# Patient Record
Sex: MALE | Race: WHITE
[De-identification: names, ages, dates, MRNs, and addresses within clinical notes are randomized per-mention and may not be internally consistent; named-entity substitution may affect disease eponyms.]

---

## 2017-04-23 ENCOUNTER — HOSPITAL ENCOUNTER (INPATIENT)
Dept: HOSPITAL 83 - ED | Age: 52
LOS: 3 days | Discharge: HOME | DRG: 371 | End: 2017-04-26
Attending: INTERNAL MEDICINE | Admitting: INTERNAL MEDICINE
Payer: COMMERCIAL

## 2017-04-23 VITALS — HEIGHT: 70 IN | WEIGHT: 171.56 LBS | BODY MASS INDEX: 24.56 KG/M2

## 2017-04-23 VITALS — DIASTOLIC BLOOD PRESSURE: 70 MMHG

## 2017-04-23 VITALS — DIASTOLIC BLOOD PRESSURE: 89 MMHG

## 2017-04-23 DIAGNOSIS — E43: ICD-10-CM

## 2017-04-23 DIAGNOSIS — A04.9: Primary | ICD-10-CM

## 2017-04-23 DIAGNOSIS — K52.9: ICD-10-CM

## 2017-04-23 DIAGNOSIS — Z79.899: ICD-10-CM

## 2017-04-23 DIAGNOSIS — D53.9: ICD-10-CM

## 2017-04-23 DIAGNOSIS — Z84.89: ICD-10-CM

## 2017-04-23 DIAGNOSIS — D72.810: ICD-10-CM

## 2017-04-23 DIAGNOSIS — E83.39: ICD-10-CM

## 2017-04-23 DIAGNOSIS — F17.200: ICD-10-CM

## 2017-04-23 DIAGNOSIS — E03.9: ICD-10-CM

## 2017-04-23 DIAGNOSIS — F10.129: ICD-10-CM

## 2017-04-23 DIAGNOSIS — I48.91: ICD-10-CM

## 2017-04-23 DIAGNOSIS — E86.0: ICD-10-CM

## 2017-04-23 DIAGNOSIS — K76.0: ICD-10-CM

## 2017-04-23 DIAGNOSIS — R55: ICD-10-CM

## 2017-04-23 DIAGNOSIS — E87.6: ICD-10-CM

## 2017-04-23 DIAGNOSIS — I10: ICD-10-CM

## 2017-04-23 LAB
ALBUMIN SERPL-MCNC: 3.6 GM/DL (ref 3.1–4.5)
ALBUMIN SERPL-MCNC: 3.7 GM/DL (ref 3.1–4.5)
ALP SERPL-CCNC: 100 U/L (ref 45–117)
ALP SERPL-CCNC: 94 U/L (ref 45–117)
ALT SERPL W P-5'-P-CCNC: 29 U/L (ref 12–78)
ALT SERPL W P-5'-P-CCNC: 29 U/L (ref 12–78)
AST SERPL-CCNC: 33 IU/L (ref 3–35)
AST SERPL-CCNC: 35 IU/L (ref 3–35)
BASOPHILS # BLD AUTO: 0 10*3/UL (ref 0–0.1)
BASOPHILS # BLD AUTO: 0.1 10*3/UL (ref 0–0.1)
BASOPHILS NFR BLD AUTO: 0.7 % (ref 0–1)
BASOPHILS NFR BLD AUTO: 0.8 % (ref 0–1)
BUN SERPL-MCNC: 6 MG/DL (ref 7–24)
BUN SERPL-MCNC: 6 MG/DL (ref 7–24)
CHLORIDE SERPL-SCNC: 107 MMOL/L (ref 98–107)
CHLORIDE SERPL-SCNC: 108 MMOL/L (ref 98–107)
CO2 SERPL-SCNC: 24 MMOL/L (ref 21–32)
CO2 SERPL-SCNC: 26 MMOL/L (ref 21–32)
CRP SERPL-MCNC: 0.45 MG/DL (ref 0–0.3)
DIGOXIN SERPL-MCNC: 0.3 NG/ML (ref 0.8–2)
EOSINOPHIL # BLD AUTO: 0.3 10*3/UL (ref 0–0.4)
EOSINOPHIL # BLD AUTO: 0.4 10*3/UL (ref 0–0.4)
EOSINOPHIL # BLD AUTO: 5.3 % (ref 1–4)
EOSINOPHIL # BLD AUTO: 5.4 % (ref 1–4)
ERYTHROCYTE [DISTWIDTH] IN BLOOD BY AUTOMATED COUNT: 12.9 % (ref 0–14.5)
ERYTHROCYTE [DISTWIDTH] IN BLOOD BY AUTOMATED COUNT: 13 % (ref 0–14.5)
GLUCOSE SERPL-MCNC: 116 MG/DL (ref 65–99)
GLUCOSE SERPL-MCNC: 89 MG/DL (ref 65–99)
HCT VFR BLD AUTO: 42.7 % (ref 42–52)
HCT VFR BLD AUTO: 42.7 % (ref 42–52)
HGB BLD-MCNC: 15.3 G/DL (ref 14–18)
HGB BLD-MCNC: 15.4 G/DL (ref 14–18)
IG #: 0.1 10*3/UL (ref 0–0.1)
IG #: 0.1 10*3/UL (ref 0–0.1)
INR BLD: 1 (ref 2–3.5)
LYMPHOCYTES # BLD AUTO: 0.8 10*3/UL (ref 1.3–4.4)
LYMPHOCYTES # BLD AUTO: 0.9 10*3/UL (ref 1.3–4.4)
LYMPHOCYTES NFR BLD AUTO: 12.2 % (ref 27–41)
LYMPHOCYTES NFR BLD AUTO: 15.4 % (ref 27–41)
MAGNESIUM SERPL-MCNC: 1.5 MG/DL (ref 1.5–2.1)
MCH RBC QN AUTO: 36.6 PG (ref 27–31)
MCH RBC QN AUTO: 36.7 PG (ref 27–31)
MCHC RBC AUTO-ENTMCNC: 35.8 G/DL (ref 33–37)
MCHC RBC AUTO-ENTMCNC: 36.1 G/DL (ref 33–37)
MCV RBC AUTO: 101.7 FL (ref 80–94)
MCV RBC AUTO: 102.2 FL (ref 80–94)
MONOCYTES # BLD AUTO: 0.8 10*3/UL (ref 0.1–1)
MONOCYTES # BLD AUTO: 0.9 10*3/UL (ref 0.1–1)
MONOCYTES NFR BLD MANUAL: 12.5 % (ref 3–9)
MONOCYTES NFR BLD MANUAL: 14.5 % (ref 3–9)
NEUT #: 4 10*3/UL (ref 2.3–7.9)
NEUT #: 4.3 10*3/UL (ref 2.3–7.9)
NEUT %: 65.1 % (ref 47–73)
NEUT %: 66 % (ref 47–73)
NRBC BLD QL AUTO: 0 10*3/UL (ref 0–0)
NRBC BLD QL AUTO: 0 10*3/UL (ref 0–0)
PLATELET # BLD AUTO: 197 10*3/UL (ref 130–400)
PLATELET # BLD AUTO: 202 10*3/UL (ref 130–400)
PMV BLD AUTO: 10.4 FL (ref 9.6–12.3)
PMV BLD AUTO: 10.5 FL (ref 9.6–12.3)
POTASSIUM SERPL-SCNC: 3 MMOL/L (ref 3.5–5.1)
POTASSIUM SERPL-SCNC: 3.1 MMOL/L (ref 3.5–5.1)
PROT SERPL-MCNC: 7.9 GM/DL (ref 6.4–8.2)
PROT SERPL-MCNC: 8 GM/DL (ref 6.4–8.2)
PROTHROMBIN TIME: 10.9 SECONDS (ref 9–12.4)
RBC # BLD AUTO: 4.18 10*6/UL (ref 4.5–5.9)
RBC # BLD AUTO: 4.2 10*6/UL (ref 4.5–5.9)
SODIUM SERPL-SCNC: 141 MMOL/L (ref 136–145)
SODIUM SERPL-SCNC: 141 MMOL/L (ref 136–145)
TROPONIN I SERPL-MCNC: < 0.015 NG/ML (ref ?–0.04)
TSH SERPL DL<=0.005 MIU/L-ACNC: 45.6 UIU/ML (ref 0.36–4.75)
WBC NRBC COR # BLD AUTO: 6.1 10*3/UL (ref 4.8–10.8)
WBC NRBC COR # BLD AUTO: 6.5 10*3/UL (ref 4.8–10.8)

## 2017-04-24 VITALS — DIASTOLIC BLOOD PRESSURE: 63 MMHG | SYSTOLIC BLOOD PRESSURE: 96 MMHG

## 2017-04-24 VITALS — DIASTOLIC BLOOD PRESSURE: 80 MMHG | SYSTOLIC BLOOD PRESSURE: 135 MMHG

## 2017-04-24 VITALS — DIASTOLIC BLOOD PRESSURE: 72 MMHG

## 2017-04-24 VITALS — DIASTOLIC BLOOD PRESSURE: 79 MMHG

## 2017-04-24 VITALS — DIASTOLIC BLOOD PRESSURE: 86 MMHG | SYSTOLIC BLOOD PRESSURE: 128 MMHG

## 2017-04-24 VITALS — SYSTOLIC BLOOD PRESSURE: 134 MMHG | DIASTOLIC BLOOD PRESSURE: 79 MMHG

## 2017-04-24 LAB
ALBUMIN SERPL-MCNC: NEGATIVE G/DL
APPEARANCE UR: CLEAR
BILIRUB UR QL STRIP: NEGATIVE
CHOLEST SERPL-MCNC: 126 MG/DL (ref ?–200)
CK MB SERPL-MCNC: 1.5 NG/ML (ref 0.5–3.6)
CK MB SERPL-MCNC: 1.7 NG/ML (ref 0.5–3.6)
CK MB SERPL-MCNC: 1.7 NG/ML (ref 0.5–3.6)
CK SERPL-CCNC: 110 U/L (ref 39–308)
CK SERPL-CCNC: 123 U/L (ref 39–308)
CK SERPL-CCNC: 99 U/L (ref 39–308)
COLOR UR: YELLOW
EPI CELLS #/AREA URNS HPF: (no result) /[HPF]
EST. AVERAGE GLUCOSE BLD GHB EST-MCNC: 94 MG/DL
GLUCOSE UR QL: NEGATIVE
HDLC SERPL-MCNC: 72 MG/DL (ref 40–60)
HGB UR QL STRIP: NEGATIVE
INR BLD: 1 (ref 2–3.5)
KETONES UR QL STRIP: NEGATIVE
LDLC SERPL DIRECT ASSAY-MCNC: 48 MG/DL (ref 9–159)
LEUKOCYTE ESTERASE UR QL STRIP: NEGATIVE
NITRITE UR QL STRIP: NEGATIVE
PH UR STRIP: 5 [PH] (ref 5–9)
PROTHROMBIN TIME: 10.9 SECONDS (ref 9–12.4)
SP GR UR: 1.01 (ref 1–1.03)
T4 FREE SERPL-MCNC: 0.48 NG/DL (ref 0.76–1.46)
TRIGL SERPL-MCNC: 29 MG/DL (ref ?–150)
TROPONIN I SERPL-MCNC: < 0.015 NG/ML (ref ?–0.04)
TSH SERPL DL<=0.005 MIU/L-ACNC: 32.1 UIU/ML (ref 0.36–4.75)
URINE REFLEX COMMENT: NO
UROBILINOGEN UR STRIP-MCNC: 0.2 E.U./DL (ref 0.2–1)
VLDLC SERPL CALC-MCNC: 6 MG/DL (ref 6–40)
WBC #/AREA URNS HPF: (no result) WBC/HPF (ref 0–5)

## 2017-04-25 VITALS — DIASTOLIC BLOOD PRESSURE: 86 MMHG

## 2017-04-25 VITALS — SYSTOLIC BLOOD PRESSURE: 150 MMHG | DIASTOLIC BLOOD PRESSURE: 89 MMHG

## 2017-04-25 VITALS — SYSTOLIC BLOOD PRESSURE: 121 MMHG | DIASTOLIC BLOOD PRESSURE: 77 MMHG

## 2017-04-25 VITALS — DIASTOLIC BLOOD PRESSURE: 78 MMHG

## 2017-04-25 VITALS — DIASTOLIC BLOOD PRESSURE: 87 MMHG

## 2017-04-25 LAB
25(OH)D3 SERPL-MCNC: 53.3 NG/ML (ref 30–100)
ALBUMIN SERPL-MCNC: 2.6 GM/DL (ref 3.1–4.5)
ALP SERPL-CCNC: 68 U/L (ref 45–117)
ALT SERPL W P-5'-P-CCNC: 16 U/L (ref 12–78)
AST SERPL-CCNC: 20 IU/L (ref 3–35)
BASOPHILS # BLD AUTO: 0.1 10*3/UL (ref 0–0.1)
BASOPHILS NFR BLD AUTO: 1.3 % (ref 0–1)
BUN SERPL-MCNC: 10 MG/DL (ref 7–24)
CHLORIDE SERPL-SCNC: 111 MMOL/L (ref 98–107)
CHOLEST SERPL-MCNC: 119 MG/DL (ref ?–200)
CO2 SERPL-SCNC: 23 MMOL/L (ref 21–32)
EOSINOPHIL # BLD AUTO: 0.2 10*3/UL (ref 0–0.4)
EOSINOPHIL # BLD AUTO: 5.8 % (ref 1–4)
ERYTHROCYTE [DISTWIDTH] IN BLOOD BY AUTOMATED COUNT: 13.2 % (ref 0–14.5)
EST. AVERAGE GLUCOSE BLD GHB EST-MCNC: 97 MG/DL
FOLATE SERPL-MCNC: 2.52 NG/ML (ref 5.38–?)
GLUCOSE SERPL-MCNC: 87 MG/DL (ref 65–99)
HCT VFR BLD AUTO: 35.3 % (ref 42–52)
HDLC SERPL-MCNC: 80 MG/DL (ref 40–60)
HGB BLD-MCNC: 12.5 G/DL (ref 14–18)
IG #: 0 10*3/UL (ref 0–0.1)
LDLC SERPL DIRECT ASSAY-MCNC: 34 MG/DL (ref 9–159)
LYMPHOCYTES # BLD AUTO: 0.5 10*3/UL (ref 1.3–4.4)
LYMPHOCYTES NFR BLD AUTO: 12.3 % (ref 27–41)
MAGNESIUM SERPL-MCNC: 1.1 MG/DL (ref 1.5–2.1)
MCH RBC QN AUTO: 36.8 PG (ref 27–31)
MCHC RBC AUTO-ENTMCNC: 35.4 G/DL (ref 33–37)
MCV RBC AUTO: 103.8 FL (ref 80–94)
MONOCYTES # BLD AUTO: 0.7 10*3/UL (ref 0.1–1)
MONOCYTES NFR BLD MANUAL: 16.3 % (ref 3–9)
NEUT #: 2.5 10*3/UL (ref 2.3–7.9)
NEUT %: 63.5 % (ref 47–73)
NRBC BLD QL AUTO: 0 10*3/UL (ref 0–0)
PHOSPHATE SERPL-MCNC: 2.4 MG/DL (ref 2.5–4.9)
PLATELET # BLD AUTO: 149 10*3/UL (ref 130–400)
PMV BLD AUTO: 10.7 FL (ref 9.6–12.3)
POTASSIUM SERPL-SCNC: 3.1 MMOL/L (ref 3.5–5.1)
PROT SERPL-MCNC: 6 GM/DL (ref 6.4–8.2)
RBC # BLD AUTO: 3.4 10*6/UL (ref 4.5–5.9)
SODIUM SERPL-SCNC: 144 MMOL/L (ref 136–145)
T4 FREE SERPL-MCNC: 0.58 NG/DL (ref 0.76–1.46)
TRIGL SERPL-MCNC: 27 MG/DL (ref ?–150)
VITAMIN B12: 759 PG/ML (ref 247–911)
VLDLC SERPL CALC-MCNC: 5 MG/DL (ref 6–40)
WBC NRBC COR # BLD AUTO: 4 10*3/UL (ref 4.8–10.8)

## 2017-04-26 VITALS — SYSTOLIC BLOOD PRESSURE: 167 MMHG | DIASTOLIC BLOOD PRESSURE: 96 MMHG

## 2017-04-26 LAB
BASOPHILS # BLD AUTO: 0 10*3/UL (ref 0–0.1)
BASOPHILS NFR BLD AUTO: 0.7 % (ref 0–1)
BUN SERPL-MCNC: 10 MG/DL (ref 7–24)
CHLORIDE SERPL-SCNC: 110 MMOL/L (ref 98–107)
CO2 SERPL-SCNC: 24 MMOL/L (ref 21–32)
EOSINOPHIL # BLD AUTO: 0.2 10*3/UL (ref 0–0.4)
EOSINOPHIL # BLD AUTO: 5.3 % (ref 1–4)
ERYTHROCYTE [DISTWIDTH] IN BLOOD BY AUTOMATED COUNT: 13.3 % (ref 0–14.5)
GLUCOSE SERPL-MCNC: 113 MG/DL (ref 65–99)
HCT VFR BLD AUTO: 35 % (ref 42–52)
HGB BLD-MCNC: 12.4 G/DL (ref 14–18)
IG #: 0 10*3/UL (ref 0–0.1)
LYMPHOCYTES # BLD AUTO: 0.6 10*3/UL (ref 1.3–4.4)
LYMPHOCYTES NFR BLD AUTO: 13.5 % (ref 27–41)
MAGNESIUM SERPL-MCNC: 1.1 MG/DL (ref 1.5–2.1)
MCH RBC QN AUTO: 36.7 PG (ref 27–31)
MCHC RBC AUTO-ENTMCNC: 35.4 G/DL (ref 33–37)
MCV RBC AUTO: 103.6 FL (ref 80–94)
MONOCYTES # BLD AUTO: 0.6 10*3/UL (ref 0.1–1)
MONOCYTES NFR BLD MANUAL: 14.2 % (ref 3–9)
NEUT #: 2.9 10*3/UL (ref 2.3–7.9)
NEUT %: 65.6 % (ref 47–73)
NRBC BLD QL AUTO: 0 10*3/UL (ref 0–0)
PHOSPHATE SERPL-MCNC: 2.9 MG/DL (ref 2.5–4.9)
PLATELET # BLD AUTO: 147 10*3/UL (ref 130–400)
PMV BLD AUTO: 11.2 FL (ref 9.6–12.3)
POTASSIUM SERPL-SCNC: 3.5 MMOL/L (ref 3.5–5.1)
RBC # BLD AUTO: 3.38 10*6/UL (ref 4.5–5.9)
SODIUM SERPL-SCNC: 141 MMOL/L (ref 136–145)
WBC NRBC COR # BLD AUTO: 4.4 10*3/UL (ref 4.8–10.8)

## 2017-08-30 ENCOUNTER — HOSPITAL ENCOUNTER (OUTPATIENT)
Dept: HOSPITAL 83 - LAB | Age: 52
Discharge: HOME | End: 2017-08-30
Attending: FAMILY MEDICINE
Payer: COMMERCIAL

## 2017-08-30 DIAGNOSIS — F10.99: ICD-10-CM

## 2017-08-30 DIAGNOSIS — Z72.0: ICD-10-CM

## 2017-08-30 DIAGNOSIS — Z12.5: Primary | ICD-10-CM

## 2017-08-30 DIAGNOSIS — Z85.49: ICD-10-CM

## 2017-08-30 DIAGNOSIS — E03.9: ICD-10-CM

## 2017-08-30 LAB
ALBUMIN SERPL-MCNC: 3.8 GM/DL (ref 3.1–4.5)
ALP SERPL-CCNC: 81 U/L (ref 45–117)
ALT SERPL W P-5'-P-CCNC: 20 U/L (ref 12–78)
AST SERPL-CCNC: 19 IU/L (ref 3–35)
BUN SERPL-MCNC: 10 MG/DL (ref 7–24)
CHLORIDE SERPL-SCNC: 103 MMOL/L (ref 98–107)
CHOLEST SERPL-MCNC: 188 MG/DL (ref ?–200)
CREAT SERPL-MCNC: 1 MG/DL (ref 0.7–1.3)
ERYTHROCYTE [DISTWIDTH] IN BLOOD BY AUTOMATED COUNT: 14.4 % (ref 0–14.5)
GGT SERPL-CCNC: 33 U/L (ref 15–85)
HCT VFR BLD AUTO: 44.5 % (ref 42–52)
HDLC SERPL-MCNC: 88 MG/DL (ref 40–60)
HGB BLD-MCNC: 15.6 G/DL (ref 14–18)
LDLC SERPL DIRECT ASSAY-MCNC: 85 MG/DL (ref 9–159)
MACROCYTES BLD QL SMEAR: (no result)
MAGNESIUM SERPL-MCNC: 1.4 MG/DL (ref 1.5–2.1)
MCH RBC QN AUTO: 38.4 PG (ref 27–31)
MCHC RBC AUTO-ENTMCNC: 35.1 G/DL (ref 33–37)
MCV RBC AUTO: 109.6 FL (ref 80–94)
NRBC BLD QL AUTO: 0 % (ref 0–0)
PLATELET # BLD AUTO: 197 10*3/UL (ref 130–400)
PLATELET SUFFICIENCY: NORMAL
PMV BLD AUTO: 10.7 FL (ref 9.6–12.3)
POTASSIUM SERPL-SCNC: 3.8 MMOL/L (ref 3.5–5.1)
PROT SERPL-MCNC: 8.4 GM/DL (ref 6.4–8.2)
RBC # BLD AUTO: 4.06 10*6/UL (ref 4.5–5.9)
SODIUM SERPL-SCNC: 139 MMOL/L (ref 136–145)
TOTAL CELLS COUNTED: 100 #CELLS
TRIGL SERPL-MCNC: 74 MG/DL (ref ?–150)
TSH SERPL DL<=0.005 MIU/L-ACNC: 69.6 UIU/ML (ref 0.36–4.75)
VLDLC SERPL CALC-MCNC: 15 MG/DL (ref 6–40)
WBC NRBC COR # BLD AUTO: 4.7 10*3/UL (ref 4.8–10.8)

## 2017-12-01 ENCOUNTER — HOSPITAL ENCOUNTER (OUTPATIENT)
Dept: HOSPITAL 83 - LAB | Age: 52
Discharge: HOME | End: 2017-12-01
Attending: NURSE PRACTITIONER
Payer: COMMERCIAL

## 2017-12-01 DIAGNOSIS — E03.9: Primary | ICD-10-CM

## 2018-01-31 ENCOUNTER — HOSPITAL ENCOUNTER (OUTPATIENT)
Dept: HOSPITAL 83 - LAB | Age: 53
Discharge: HOME | End: 2018-01-31
Attending: NURSE PRACTITIONER
Payer: COMMERCIAL

## 2018-01-31 DIAGNOSIS — E03.9: Primary | ICD-10-CM

## 2018-03-11 ENCOUNTER — HOSPITAL ENCOUNTER (INPATIENT)
Dept: HOSPITAL 83 - ED | Age: 53
LOS: 1 days | Discharge: HOME | DRG: 190 | End: 2018-03-12
Attending: INTERNAL MEDICINE | Admitting: INTERNAL MEDICINE
Payer: COMMERCIAL

## 2018-03-11 VITALS — DIASTOLIC BLOOD PRESSURE: 70 MMHG | SYSTOLIC BLOOD PRESSURE: 110 MMHG

## 2018-03-11 VITALS — HEIGHT: 69.5 IN | BODY MASS INDEX: 24.79 KG/M2 | WEIGHT: 171.19 LBS

## 2018-03-11 VITALS — SYSTOLIC BLOOD PRESSURE: 148 MMHG | DIASTOLIC BLOOD PRESSURE: 72 MMHG

## 2018-03-11 VITALS — DIASTOLIC BLOOD PRESSURE: 51 MMHG

## 2018-03-11 VITALS — DIASTOLIC BLOOD PRESSURE: 89 MMHG

## 2018-03-11 VITALS — DIASTOLIC BLOOD PRESSURE: 71 MMHG

## 2018-03-11 VITALS — DIASTOLIC BLOOD PRESSURE: 70 MMHG | SYSTOLIC BLOOD PRESSURE: 156 MMHG

## 2018-03-11 VITALS — DIASTOLIC BLOOD PRESSURE: 78 MMHG

## 2018-03-11 DIAGNOSIS — E44.1: ICD-10-CM

## 2018-03-11 DIAGNOSIS — D72.810: ICD-10-CM

## 2018-03-11 DIAGNOSIS — F17.210: ICD-10-CM

## 2018-03-11 DIAGNOSIS — R07.89: ICD-10-CM

## 2018-03-11 DIAGNOSIS — C14.0: ICD-10-CM

## 2018-03-11 DIAGNOSIS — Z83.3: ICD-10-CM

## 2018-03-11 DIAGNOSIS — I10: ICD-10-CM

## 2018-03-11 DIAGNOSIS — Z71.6: ICD-10-CM

## 2018-03-11 DIAGNOSIS — E53.8: ICD-10-CM

## 2018-03-11 DIAGNOSIS — I48.0: ICD-10-CM

## 2018-03-11 DIAGNOSIS — K76.0: ICD-10-CM

## 2018-03-11 DIAGNOSIS — J18.9: ICD-10-CM

## 2018-03-11 DIAGNOSIS — E55.9: ICD-10-CM

## 2018-03-11 DIAGNOSIS — Z87.81: ICD-10-CM

## 2018-03-11 DIAGNOSIS — F10.129: ICD-10-CM

## 2018-03-11 DIAGNOSIS — E03.9: ICD-10-CM

## 2018-03-11 DIAGNOSIS — Z82.49: ICD-10-CM

## 2018-03-11 DIAGNOSIS — F32.9: ICD-10-CM

## 2018-03-11 DIAGNOSIS — D53.9: ICD-10-CM

## 2018-03-11 DIAGNOSIS — I25.118: ICD-10-CM

## 2018-03-11 DIAGNOSIS — J44.1: Primary | ICD-10-CM

## 2018-03-11 DIAGNOSIS — J44.0: ICD-10-CM

## 2018-03-11 LAB
25(OH)D3 SERPL-MCNC: 23.7 NG/ML (ref 30–100)
ALBUMIN SERPL-MCNC: 3.2 GM/DL (ref 3.1–4.5)
ALBUMIN SERPL-MCNC: 3.8 GM/DL (ref 3.1–4.5)
ALP SERPL-CCNC: 62 U/L (ref 45–117)
ALP SERPL-CCNC: 74 U/L (ref 45–117)
ALT SERPL W P-5'-P-CCNC: 18 U/L (ref 12–78)
ALT SERPL W P-5'-P-CCNC: 21 U/L (ref 12–78)
AMPHETAMINES UR QL SCN: < 1000
APPEARANCE UR: CLEAR
APTT PPP: 26.5 SECONDS (ref 20.8–31.5)
APTT PPP: 26.9 SECONDS (ref 20.8–31.5)
AST SERPL-CCNC: 24 IU/L (ref 3–35)
AST SERPL-CCNC: 29 IU/L (ref 3–35)
BARBITURATES UR QL SCN: < 200
BASOPHILS # BLD AUTO: 0 10*3/UL (ref 0–0.1)
BASOPHILS # BLD AUTO: 0.1 10*3/UL (ref 0–0.1)
BASOPHILS NFR BLD AUTO: 1 % (ref 0–1)
BASOPHILS NFR BLD AUTO: 1.1 % (ref 0–1)
BENZODIAZ UR QL SCN: < 200
BILIRUB UR QL STRIP: NEGATIVE
BUN SERPL-MCNC: 11 MG/DL (ref 7–24)
BUN SERPL-MCNC: 11 MG/DL (ref 7–24)
BZE UR QL SCN: < 300
CANNABINOIDS UR QL SCN: < 50
CHLORIDE SERPL-SCNC: 104 MMOL/L (ref 98–107)
CHLORIDE SERPL-SCNC: 107 MMOL/L (ref 98–107)
CHOLEST SERPL-MCNC: 150 MG/DL (ref ?–200)
COLOR UR: YELLOW
CREAT SERPL-MCNC: 0.99 MG/DL (ref 0.7–1.3)
CREAT SERPL-MCNC: 1.01 MG/DL (ref 0.7–1.3)
EOSINOPHIL # BLD AUTO: 0.2 10*3/UL (ref 0–0.4)
EOSINOPHIL # BLD AUTO: 0.3 10*3/UL (ref 0–0.4)
EOSINOPHIL # BLD AUTO: 7.3 % (ref 1–4)
EOSINOPHIL # BLD AUTO: 8 % (ref 1–4)
ERYTHROCYTE [DISTWIDTH] IN BLOOD BY AUTOMATED COUNT: 13.3 % (ref 0–14.5)
ERYTHROCYTE [DISTWIDTH] IN BLOOD BY AUTOMATED COUNT: 13.3 % (ref 0–14.5)
GLUCOSE UR QL: NEGATIVE
HCT VFR BLD AUTO: 34.3 % (ref 42–52)
HCT VFR BLD AUTO: 37.3 % (ref 42–52)
HDLC SERPL-MCNC: 88 MG/DL (ref 40–60)
HGB BLD-MCNC: 12.3 G/DL (ref 14–18)
HGB BLD-MCNC: 13.1 G/DL (ref 14–18)
HGB UR QL STRIP: NEGATIVE
INR BLD: 0.9 (ref 2–3.5)
INR BLD: 1 (ref 2–3.5)
KETONES UR QL STRIP: NEGATIVE
LDLC SERPL DIRECT ASSAY-MCNC: 54 MG/DL (ref 9–159)
LEUKOCYTE ESTERASE UR QL STRIP: NEGATIVE
LYMPHOCYTES # BLD AUTO: 0.9 10*3/UL (ref 1.3–4.4)
LYMPHOCYTES # BLD AUTO: 1.4 10*3/UL (ref 1.3–4.4)
LYMPHOCYTES NFR BLD AUTO: 30.3 % (ref 27–41)
LYMPHOCYTES NFR BLD AUTO: 30.5 % (ref 27–41)
MCH RBC QN AUTO: 36.6 PG (ref 27–31)
MCH RBC QN AUTO: 37.3 PG (ref 27–31)
MCHC RBC AUTO-ENTMCNC: 35.1 G/DL (ref 33–37)
MCHC RBC AUTO-ENTMCNC: 35.9 G/DL (ref 33–37)
MCV RBC AUTO: 103.9 FL (ref 80–94)
MCV RBC AUTO: 104.2 FL (ref 80–94)
METHADONE UR QL SCN: < 300
MONOCYTES # BLD AUTO: 0.4 10*3/UL (ref 0.1–1)
MONOCYTES # BLD AUTO: 0.7 10*3/UL (ref 0.1–1)
MONOCYTES NFR BLD MANUAL: 12.3 % (ref 3–9)
MONOCYTES NFR BLD MANUAL: 15.8 % (ref 3–9)
NEUT #: 1.4 10*3/UL (ref 2.3–7.9)
NEUT #: 2 10*3/UL (ref 2.3–7.9)
NEUT %: 44.6 % (ref 47–73)
NEUT %: 47.7 % (ref 47–73)
NITRITE UR QL STRIP: NEGATIVE
NRBC BLD QL AUTO: 0 % (ref 0–0)
NRBC BLD QL AUTO: 0 10*3/UL (ref 0–0)
OPIATES UR QL SCN: < 300
PCP UR QL SCN: <  25
PH UR STRIP: 5 [PH] (ref 5–9)
PHOSPHATE SERPL-MCNC: 3.9 MG/DL (ref 2.5–4.9)
PLATELET # BLD AUTO: 144 10*3/UL (ref 130–400)
PLATELET # BLD AUTO: 180 10*3/UL (ref 130–400)
PMV BLD AUTO: 9.6 FL (ref 9.6–12.3)
PMV BLD AUTO: 9.9 FL (ref 9.6–12.3)
POTASSIUM SERPL-SCNC: 3.8 MMOL/L (ref 3.5–5.1)
POTASSIUM SERPL-SCNC: 4.8 MMOL/L (ref 3.5–5.1)
PROT SERPL-MCNC: 6.8 GM/DL (ref 6.4–8.2)
PROT SERPL-MCNC: 7.8 GM/DL (ref 6.4–8.2)
RBC # BLD AUTO: 3.3 10*6/UL (ref 4.5–5.9)
RBC # BLD AUTO: 3.58 10*6/UL (ref 4.5–5.9)
RBC #/AREA URNS HPF: (no result) RBC/HPF (ref 0–2)
SODIUM SERPL-SCNC: 140 MMOL/L (ref 136–145)
SODIUM SERPL-SCNC: 143 MMOL/L (ref 136–145)
SP GR UR: 1.01 (ref 1–1.03)
T4 FREE SERPL-MCNC: 0.84 NG/DL (ref 0.76–1.46)
TRIGL SERPL-MCNC: 41 MG/DL (ref ?–150)
TROPONIN I SERPL-MCNC: < 0.015 NG/ML (ref ?–0.04)
TSH SERPL DL<=0.005 MIU/L-ACNC: 0.67 UIU/ML (ref 0.36–4.75)
UROBILINOGEN UR STRIP-MCNC: 0.2 E.U./DL (ref 0.2–1)
VITAMIN B12: 650 PG/ML (ref 247–911)
VLDLC SERPL CALC-MCNC: 8 MG/DL (ref 6–40)
WBC #/AREA URNS HPF: (no result) WBC/HPF (ref 0–5)
WBC NRBC COR # BLD AUTO: 3 10*3/UL (ref 4.8–10.8)
WBC NRBC COR # BLD AUTO: 4.5 10*3/UL (ref 4.8–10.8)

## 2018-03-12 VITALS — DIASTOLIC BLOOD PRESSURE: 82 MMHG

## 2018-03-12 VITALS — DIASTOLIC BLOOD PRESSURE: 80 MMHG | SYSTOLIC BLOOD PRESSURE: 168 MMHG

## 2018-03-12 VITALS — SYSTOLIC BLOOD PRESSURE: 117 MMHG | DIASTOLIC BLOOD PRESSURE: 81 MMHG

## 2018-03-12 VITALS — DIASTOLIC BLOOD PRESSURE: 66 MMHG | SYSTOLIC BLOOD PRESSURE: 158 MMHG

## 2018-03-12 VITALS — DIASTOLIC BLOOD PRESSURE: 84 MMHG | SYSTOLIC BLOOD PRESSURE: 134 MMHG

## 2018-03-12 LAB
BASOPHILS # BLD AUTO: 0 10*3/UL (ref 0–0.1)
BASOPHILS NFR BLD AUTO: 0.1 % (ref 0–1)
EOSINOPHIL # BLD AUTO: 0 % (ref 1–4)
EOSINOPHIL # BLD AUTO: 0 10*3/UL (ref 0–0.4)
ERYTHROCYTE [DISTWIDTH] IN BLOOD BY AUTOMATED COUNT: 13 % (ref 0–14.5)
HCT VFR BLD AUTO: 43.7 % (ref 42–52)
HGB BLD-MCNC: 15.8 G/DL (ref 14–18)
LYMPHOCYTES # BLD AUTO: 0.3 10*3/UL (ref 1.3–4.4)
LYMPHOCYTES NFR BLD AUTO: 4.4 % (ref 27–41)
MCH RBC QN AUTO: 36.6 PG (ref 27–31)
MCHC RBC AUTO-ENTMCNC: 36.2 G/DL (ref 33–37)
MCV RBC AUTO: 101.2 FL (ref 80–94)
MONOCYTES # BLD AUTO: 0.4 10*3/UL (ref 0.1–1)
MONOCYTES NFR BLD MANUAL: 5.2 % (ref 3–9)
NEUT #: 6.8 10*3/UL (ref 2.3–7.9)
NEUT %: 89.6 % (ref 47–73)
NRBC BLD QL AUTO: 0 10*3/UL (ref 0–0)
PLATELET # BLD AUTO: 165 10*3/UL (ref 130–400)
PMV BLD AUTO: 11.1 FL (ref 9.6–12.3)
RBC # BLD AUTO: 4.32 10*6/UL (ref 4.5–5.9)
WBC NRBC COR # BLD AUTO: 7.6 10*3/UL (ref 4.8–10.8)

## 2018-03-12 PROCEDURE — 4A02XM4 MEASUREMENT OF CARDIAC TOTAL ACTIVITY, EXTERNAL APPROACH: ICD-10-PCS

## 2018-03-12 PROCEDURE — 3E073KZ INTRODUCTION OF OTHER DIAGNOSTIC SUBSTANCE INTO CORONARY ARTERY, PERCUTANEOUS APPROACH: ICD-10-PCS

## 2018-04-06 ENCOUNTER — HOSPITAL ENCOUNTER (INPATIENT)
Dept: HOSPITAL 83 - ED | Age: 53
LOS: 2 days | Discharge: HOME | DRG: 897 | End: 2018-04-08
Attending: INTERNAL MEDICINE | Admitting: INTERNAL MEDICINE
Payer: COMMERCIAL

## 2018-04-06 VITALS — WEIGHT: 174.38 LBS | BODY MASS INDEX: 24.97 KG/M2 | HEIGHT: 70 IN

## 2018-04-06 VITALS — SYSTOLIC BLOOD PRESSURE: 118 MMHG | DIASTOLIC BLOOD PRESSURE: 62 MMHG

## 2018-04-06 VITALS — DIASTOLIC BLOOD PRESSURE: 110 MMHG
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
## 2018-04-06 DIAGNOSIS — R74.0: ICD-10-CM

## 2018-04-06 DIAGNOSIS — Z85.89: ICD-10-CM

## 2018-04-06 DIAGNOSIS — F32.9: ICD-10-CM

## 2018-04-06 DIAGNOSIS — Z82.49: ICD-10-CM

## 2018-04-06 DIAGNOSIS — D69.6: ICD-10-CM

## 2018-04-06 DIAGNOSIS — Z86.73: ICD-10-CM

## 2018-04-06 DIAGNOSIS — M16.12: ICD-10-CM

## 2018-04-06 DIAGNOSIS — E87.1: ICD-10-CM

## 2018-04-06 DIAGNOSIS — Z72.0: ICD-10-CM

## 2018-04-06 DIAGNOSIS — E03.9: ICD-10-CM

## 2018-04-06 DIAGNOSIS — E53.8: ICD-10-CM

## 2018-04-06 DIAGNOSIS — Z87.01: ICD-10-CM

## 2018-04-06 DIAGNOSIS — K76.0: ICD-10-CM

## 2018-04-06 DIAGNOSIS — M85.80: ICD-10-CM

## 2018-04-06 DIAGNOSIS — I25.10: ICD-10-CM

## 2018-04-06 DIAGNOSIS — F41.9: ICD-10-CM

## 2018-04-06 DIAGNOSIS — F10.129: Primary | ICD-10-CM

## 2018-04-06 DIAGNOSIS — I48.91: ICD-10-CM

## 2018-04-06 DIAGNOSIS — Z84.89: ICD-10-CM
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
## 2018-04-06 DIAGNOSIS — F12.90: ICD-10-CM

## 2018-04-06 DIAGNOSIS — I10: ICD-10-CM

## 2018-04-06 DIAGNOSIS — J43.9: ICD-10-CM
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
## 2018-04-06 DIAGNOSIS — Z71.6: ICD-10-CM

## 2018-04-06 DIAGNOSIS — Z83.3: ICD-10-CM

## 2018-04-06 DIAGNOSIS — E55.9: ICD-10-CM

## 2018-04-06 DIAGNOSIS — R29.898: ICD-10-CM

## 2018-04-06 DIAGNOSIS — M19.042: ICD-10-CM

## 2018-04-06 DIAGNOSIS — D53.9: ICD-10-CM

## 2018-04-06 DIAGNOSIS — Z79.899: ICD-10-CM
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
## 2018-04-06 LAB
ALBUMIN SERPL-MCNC: 4.2 GM/DL (ref 3.1–4.5)
ALP SERPL-CCNC: 65 U/L (ref 45–117)
ALT SERPL W P-5'-P-CCNC: 35 U/L (ref 12–78)
AMPHETAMINES UR QL SCN: < 1000
APAP SERPL-MCNC: < 2 UG/ML (ref 10–30)
APPEARANCE UR: CLEAR
AST SERPL-CCNC: 51 IU/L (ref 3–35)
BACTERIA #/AREA URNS HPF: (no result) /[HPF]
BARBITURATES UR QL SCN: < 200
BASOPHILS # BLD AUTO: 0.1 10*3/UL (ref 0–0.1)
BASOPHILS NFR BLD AUTO: 0.8 % (ref 0–1)
BENZODIAZ UR QL SCN: < 200
BILIRUB UR QL STRIP: NEGATIVE
BUN SERPL-MCNC: 5 MG/DL (ref 7–24)
BZE UR QL SCN: < 300
CANNABINOIDS UR QL SCN: < 50
CHLORIDE SERPL-SCNC: 98 MMOL/L (ref 98–107)
COLOR UR: YELLOW
CREAT SERPL-MCNC: 0.84 MG/DL (ref 0.7–1.3)
EOSINOPHIL # BLD AUTO: 0.2 10*3/UL (ref 0–0.4)
EOSINOPHIL # BLD AUTO: 3.7 % (ref 1–4)
ERYTHROCYTE [DISTWIDTH] IN BLOOD BY AUTOMATED COUNT: 14.5 % (ref 0–14.5)
ETHANOL SERPL-MCNC: 292 MG/DL (ref ?–3)
GLUCOSE UR QL: NEGATIVE
HCT VFR BLD AUTO: 40.8 % (ref 42–52)
HGB BLD-MCNC: 14 G/DL (ref 14–18)
HGB UR QL STRIP: NEGATIVE
KETONES UR QL STRIP: NEGATIVE
LEUKOCYTE ESTERASE UR QL STRIP: NEGATIVE
LYMPHOCYTES # BLD AUTO: 1 10*3/UL (ref 1.3–4.4)
LYMPHOCYTES NFR BLD AUTO: 15.6 % (ref 27–41)
MCH RBC QN AUTO: 36.6 PG (ref 27–31)
MCHC RBC AUTO-ENTMCNC: 34.3 G/DL (ref 33–37)
MCV RBC AUTO: 106.5 FL (ref 80–94)
METHADONE UR QL SCN: < 300
MONOCYTES # BLD AUTO: 0.5 10*3/UL (ref 0.1–1)
MONOCYTES NFR BLD MANUAL: 8 % (ref 3–9)
NEUT #: 4.4 10*3/UL (ref 2.3–7.9)
NEUT %: 71.3 % (ref 47–73)
NITRITE UR QL STRIP: NEGATIVE
NRBC BLD QL AUTO: 0 % (ref 0–0)
OPIATES UR QL SCN: < 300
PCP UR QL SCN: <  25
PH UR STRIP: 6 [PH] (ref 5–9)
PLATELET # BLD AUTO: 141 10*3/UL (ref 130–400)
PMV BLD AUTO: 10.4 FL (ref 9.6–12.3)
POTASSIUM SERPL-SCNC: 4 MMOL/L (ref 3.5–5.1)
PROT SERPL-MCNC: 7.9 GM/DL (ref 6.4–8.2)
RBC # BLD AUTO: 3.83 10*6/UL (ref 4.5–5.9)
SODIUM SERPL-SCNC: 134 MMOL/L (ref 136–145)
SP GR UR: <= 1.005 (ref 1–1.03)
UROBILINOGEN UR STRIP-MCNC: 0.2 E.U./DL (ref 0.2–1)
WBC #/AREA URNS HPF: (no result) WBC/HPF (ref 0–5)
WBC NRBC COR # BLD AUTO: 6.2 10*3/UL (ref 4.8–10.8)

## 2018-04-07 VITALS — DIASTOLIC BLOOD PRESSURE: 70 MMHG | SYSTOLIC BLOOD PRESSURE: 130 MMHG

## 2018-04-07 VITALS — SYSTOLIC BLOOD PRESSURE: 148 MMHG | DIASTOLIC BLOOD PRESSURE: 89 MMHG

## 2018-04-07 VITALS — SYSTOLIC BLOOD PRESSURE: 125 MMHG | DIASTOLIC BLOOD PRESSURE: 73 MMHG

## 2018-04-07 VITALS — DIASTOLIC BLOOD PRESSURE: 86 MMHG | SYSTOLIC BLOOD PRESSURE: 142 MMHG

## 2018-04-07 VITALS — DIASTOLIC BLOOD PRESSURE: 88 MMHG

## 2018-04-07 VITALS — DIASTOLIC BLOOD PRESSURE: 80 MMHG

## 2018-04-07 VITALS — DIASTOLIC BLOOD PRESSURE: 69 MMHG

## 2018-04-07 LAB
25(OH)D3 SERPL-MCNC: 33.9 NG/ML (ref 30–100)
ALBUMIN SERPL-MCNC: 3.2 GM/DL (ref 3.1–4.5)
ALP SERPL-CCNC: 49 U/L (ref 45–117)
ALT SERPL W P-5'-P-CCNC: 25 U/L (ref 12–78)
APTT PPP: 26.9 SECONDS (ref 20.8–31.5)
AST SERPL-CCNC: 35 IU/L (ref 3–35)
BASOPHILS # BLD AUTO: 0 10*3/UL (ref 0–0.1)
BASOPHILS NFR BLD AUTO: 0.7 % (ref 0–1)
BUN SERPL-MCNC: 9 MG/DL (ref 7–24)
CHLORIDE SERPL-SCNC: 103 MMOL/L (ref 98–107)
CREAT SERPL-MCNC: 1.03 MG/DL (ref 0.7–1.3)
EOSINOPHIL # BLD AUTO: 0.2 10*3/UL (ref 0–0.4)
EOSINOPHIL # BLD AUTO: 5.4 % (ref 1–4)
ERYTHROCYTE [DISTWIDTH] IN BLOOD BY AUTOMATED COUNT: 14.4 % (ref 0–14.5)
HCT VFR BLD AUTO: 35.7 % (ref 42–52)
HGB BLD-MCNC: 12.2 G/DL (ref 14–18)
INR BLD: 1 (ref 2–3.5)
LYMPHOCYTES # BLD AUTO: 0.9 10*3/UL (ref 1.3–4.4)
LYMPHOCYTES NFR BLD AUTO: 22 % (ref 27–41)
MCH RBC QN AUTO: 36.5 PG (ref 27–31)
MCHC RBC AUTO-ENTMCNC: 34.2 G/DL (ref 33–37)
MCV RBC AUTO: 106.9 FL (ref 80–94)
MONOCYTES # BLD AUTO: 0.5 10*3/UL (ref 0.1–1)
MONOCYTES NFR BLD MANUAL: 12.1 % (ref 3–9)
NEUT #: 2.5 10*3/UL (ref 2.3–7.9)
NEUT %: 59.3 % (ref 47–73)
NRBC BLD QL AUTO: 0 % (ref 0–0)
PHOSPHATE SERPL-MCNC: 3.8 MG/DL (ref 2.5–4.9)
PLATELET # BLD AUTO: 118 10*3/UL (ref 130–400)
PMV BLD AUTO: 10.5 FL (ref 9.6–12.3)
POTASSIUM SERPL-SCNC: 3.6 MMOL/L (ref 3.5–5.1)
PROT SERPL-MCNC: 6.1 GM/DL (ref 6.4–8.2)
RBC # BLD AUTO: 3.34 10*6/UL (ref 4.5–5.9)
SODIUM SERPL-SCNC: 140 MMOL/L (ref 136–145)
VITAMIN B12: 714 PG/ML (ref 247–911)
WBC NRBC COR # BLD AUTO: 4.3 10*3/UL (ref 4.8–10.8)

## 2018-04-08 VITALS — DIASTOLIC BLOOD PRESSURE: 80 MMHG | SYSTOLIC BLOOD PRESSURE: 138 MMHG

## 2018-04-08 VITALS — SYSTOLIC BLOOD PRESSURE: 170 MMHG | DIASTOLIC BLOOD PRESSURE: 86 MMHG

## 2018-04-08 LAB
ALBUMIN SERPL-MCNC: 3.1 GM/DL (ref 3.1–4.5)
ALP SERPL-CCNC: 52 U/L (ref 45–117)
ALT SERPL W P-5'-P-CCNC: 24 U/L (ref 12–78)
AST SERPL-CCNC: 27 IU/L (ref 3–35)
BASOPHILS # BLD AUTO: 1 % (ref 0–1)
BUN SERPL-MCNC: 14 MG/DL (ref 7–24)
CHLORIDE SERPL-SCNC: 103 MMOL/L (ref 98–107)
CREAT SERPL-MCNC: 0.96 MG/DL (ref 0.7–1.3)
ERYTHROCYTE [DISTWIDTH] IN BLOOD BY AUTOMATED COUNT: 14.5 % (ref 0–14.5)
HCT VFR BLD AUTO: 38.8 % (ref 42–52)
HGB BLD-MCNC: 13 G/DL (ref 14–18)
MACROCYTES BLD QL SMEAR: (no result)
MCH RBC QN AUTO: 36.6 PG (ref 27–31)
MCHC RBC AUTO-ENTMCNC: 33.5 G/DL (ref 33–37)
MCV RBC AUTO: 109.3 FL (ref 80–94)
NRBC BLD QL AUTO: 0 10*3/UL (ref 0–0)
PLATELET # BLD AUTO: 99 10*3/UL (ref 130–400)
PLATELET SUFFICIENCY: (no result)
PMV BLD AUTO: 10.8 FL (ref 9.6–12.3)
POTASSIUM SERPL-SCNC: 3.9 MMOL/L (ref 3.5–5.1)
PROT SERPL-MCNC: 6.3 GM/DL (ref 6.4–8.2)
RBC # BLD AUTO: 3.55 10*6/UL (ref 4.5–5.9)
SODIUM SERPL-SCNC: 140 MMOL/L (ref 136–145)
T4 FREE SERPL-MCNC: 0.87 NG/DL (ref 0.76–1.46)
TOTAL CELLS COUNTED: 100 #CELLS
TSH SERPL DL<=0.005 MIU/L-ACNC: 10.3 UIU/ML (ref 0.36–4.75)
WBC NRBC COR # BLD AUTO: 4.8 10*3/UL (ref 4.8–10.8)

## 2018-04-30 ENCOUNTER — HOSPITAL ENCOUNTER (OUTPATIENT)
Dept: HOSPITAL 83 - LAB | Age: 53
Discharge: HOME | End: 2018-04-30
Attending: NURSE PRACTITIONER
Payer: COMMERCIAL

## 2018-04-30 DIAGNOSIS — Z72.51: ICD-10-CM

## 2018-04-30 DIAGNOSIS — E03.9: ICD-10-CM

## 2018-04-30 DIAGNOSIS — E78.00: ICD-10-CM

## 2018-04-30 DIAGNOSIS — F32.9: Primary | ICD-10-CM

## 2018-04-30 DIAGNOSIS — E55.9: ICD-10-CM

## 2018-04-30 LAB
ALBUMIN SERPL-MCNC: 3.4 GM/DL (ref 3.1–4.5)
ALP SERPL-CCNC: 71 U/L (ref 45–117)
ALT SERPL W P-5'-P-CCNC: 30 U/L (ref 12–78)
AST SERPL-CCNC: 31 IU/L (ref 3–35)
BUN SERPL-MCNC: 6 MG/DL (ref 7–24)
CHLORIDE SERPL-SCNC: 105 MMOL/L (ref 98–107)
CHOLEST SERPL-MCNC: 121 MG/DL (ref ?–200)
CREAT SERPL-MCNC: 0.75 MG/DL (ref 0.7–1.3)
ERYTHROCYTE [DISTWIDTH] IN BLOOD BY AUTOMATED COUNT: 13.2 % (ref 0–14.5)
HCT VFR BLD AUTO: 40.9 % (ref 42–52)
HDLC SERPL-MCNC: 79 MG/DL (ref 40–60)
HGB BLD-MCNC: 14.2 G/DL (ref 14–18)
LDLC SERPL DIRECT ASSAY-MCNC: 32 MG/DL (ref 9–159)
MCH RBC QN AUTO: 37 PG (ref 27–31)
MCHC RBC AUTO-ENTMCNC: 34.7 G/DL (ref 33–37)
MCV RBC AUTO: 106.5 FL (ref 80–94)
NRBC BLD QL AUTO: 0 10*3/UL (ref 0–0)
PLATELET # BLD AUTO: 121 10*3/UL (ref 130–400)
PMV BLD AUTO: 11 FL (ref 9.6–12.3)
POTASSIUM SERPL-SCNC: 3.9 MMOL/L (ref 3.5–5.1)
PROT SERPL-MCNC: 7.3 GM/DL (ref 6.4–8.2)
RBC # BLD AUTO: 3.84 10*6/UL (ref 4.5–5.9)
SODIUM SERPL-SCNC: 135 MMOL/L (ref 136–145)
TRIGL SERPL-MCNC: 49 MG/DL (ref ?–150)
TSH SERPL DL<=0.005 MIU/L-ACNC: 0.75 UIU/ML (ref 0.36–4.75)
VLDLC SERPL CALC-MCNC: 10 MG/DL (ref 6–40)
WBC NRBC COR # BLD AUTO: 5.2 10*3/UL (ref 4.8–10.8)

## 2018-05-01 LAB — HEPATITIS C VIRUS ANTIBODY: <0.1 S/CO (ref 0–0.9)

## 2018-05-24 ENCOUNTER — HOSPITAL ENCOUNTER (OUTPATIENT)
Dept: HOSPITAL 83 - CT | Age: 53
Discharge: HOME | End: 2018-05-24
Attending: NURSE PRACTITIONER
Payer: COMMERCIAL
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
## 2018-05-24 DIAGNOSIS — S09.90XA: Primary | ICD-10-CM
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
## 2018-06-19 ENCOUNTER — HOSPITAL ENCOUNTER (INPATIENT)
Dept: HOSPITAL 83 - ED | Age: 53
LOS: 6 days | Discharge: HOME | DRG: 918 | End: 2018-06-25
Attending: INTERNAL MEDICINE | Admitting: INTERNAL MEDICINE
Payer: COMMERCIAL

## 2018-06-19 VITALS — DIASTOLIC BLOOD PRESSURE: 81 MMHG | SYSTOLIC BLOOD PRESSURE: 123 MMHG

## 2018-06-19 VITALS — DIASTOLIC BLOOD PRESSURE: 69 MMHG

## 2018-06-19 VITALS — DIASTOLIC BLOOD PRESSURE: 79 MMHG

## 2018-06-19 VITALS — DIASTOLIC BLOOD PRESSURE: 70 MMHG

## 2018-06-19 VITALS — HEIGHT: 70 IN

## 2018-06-19 DIAGNOSIS — E87.1: ICD-10-CM

## 2018-06-19 DIAGNOSIS — J44.9: ICD-10-CM

## 2018-06-19 DIAGNOSIS — Z84.89: ICD-10-CM

## 2018-06-19 DIAGNOSIS — I25.10: ICD-10-CM

## 2018-06-19 DIAGNOSIS — I10: ICD-10-CM

## 2018-06-19 DIAGNOSIS — E83.42: ICD-10-CM
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
## 2018-06-19 DIAGNOSIS — K44.9: ICD-10-CM

## 2018-06-19 DIAGNOSIS — Z79.899: ICD-10-CM

## 2018-06-19 DIAGNOSIS — F17.210: ICD-10-CM

## 2018-06-19 DIAGNOSIS — E89.0: ICD-10-CM

## 2018-06-19 DIAGNOSIS — I48.2: ICD-10-CM

## 2018-06-19 DIAGNOSIS — R44.1: ICD-10-CM

## 2018-06-19 DIAGNOSIS — Z82.49: ICD-10-CM

## 2018-06-19 DIAGNOSIS — K57.90: ICD-10-CM

## 2018-06-19 DIAGNOSIS — E53.8: ICD-10-CM

## 2018-06-19 DIAGNOSIS — F10.220: ICD-10-CM

## 2018-06-19 DIAGNOSIS — R65.10: ICD-10-CM

## 2018-06-19 DIAGNOSIS — T14.91XA: ICD-10-CM

## 2018-06-19 DIAGNOSIS — Z85.89: ICD-10-CM

## 2018-06-19 DIAGNOSIS — M85.80: ICD-10-CM

## 2018-06-19 DIAGNOSIS — Z71.6: ICD-10-CM

## 2018-06-19 DIAGNOSIS — F10.231: ICD-10-CM

## 2018-06-19 DIAGNOSIS — D53.9: ICD-10-CM

## 2018-06-19 DIAGNOSIS — Z90.49: ICD-10-CM

## 2018-06-19 DIAGNOSIS — E44.0: ICD-10-CM

## 2018-06-19 DIAGNOSIS — M19.90: ICD-10-CM

## 2018-06-19 DIAGNOSIS — D72.810: ICD-10-CM

## 2018-06-19 DIAGNOSIS — Z83.3: ICD-10-CM

## 2018-06-19 DIAGNOSIS — T43.592A: Primary | ICD-10-CM

## 2018-06-19 DIAGNOSIS — Z91.81: ICD-10-CM

## 2018-06-19 DIAGNOSIS — E83.51: ICD-10-CM

## 2018-06-19 DIAGNOSIS — F33.2: ICD-10-CM

## 2018-06-19 DIAGNOSIS — R00.0: ICD-10-CM

## 2018-06-19 LAB
ALBUMIN SERPL-MCNC: 3.2 GM/DL (ref 3.1–4.5)
ALP SERPL-CCNC: 61 U/L (ref 45–117)
ALT SERPL W P-5'-P-CCNC: 27 U/L (ref 12–78)
AMPHETAMINES UR QL SCN: < 1000
APAP SERPL-MCNC: < 2 UG/ML (ref 10–30)
APPEARANCE UR: CLEAR
AST SERPL-CCNC: 22 IU/L (ref 3–35)
BARBITURATES UR QL SCN: < 200
BASOPHILS # BLD AUTO: 0.1 10*3/UL (ref 0–0.1)
BASOPHILS NFR BLD AUTO: 1 % (ref 0–1)
BENZODIAZ UR QL SCN: < 200
BILIRUB UR QL STRIP: NEGATIVE
BUN SERPL-MCNC: 6 MG/DL (ref 7–24)
BZE UR QL SCN: < 300
CANNABINOIDS UR QL SCN: < 50
CHLORIDE SERPL-SCNC: 105 MMOL/L (ref 98–107)
COLOR UR: YELLOW
CREAT SERPL-MCNC: 0.72 MG/DL (ref 0.7–1.3)
EOSINOPHIL # BLD AUTO: 0.1 10*3/UL (ref 0–0.4)
EOSINOPHIL # BLD AUTO: 1.7 % (ref 1–4)
EPI CELLS #/AREA URNS HPF: (no result) /[HPF]
ERYTHROCYTE [DISTWIDTH] IN BLOOD BY AUTOMATED COUNT: 12.9 % (ref 0–14.5)
ETHANOL SERPL-MCNC: 298 MG/DL (ref ?–3)
GLUCOSE UR QL: NEGATIVE
HCT VFR BLD AUTO: 36.7 % (ref 42–52)
HGB BLD-MCNC: 12.6 G/DL (ref 14–18)
HGB UR QL STRIP: NEGATIVE
KETONES UR QL STRIP: NEGATIVE
LEUKOCYTE ESTERASE UR QL STRIP: NEGATIVE
LYMPHOCYTES # BLD AUTO: 0.7 10*3/UL (ref 1.3–4.4)
LYMPHOCYTES NFR BLD AUTO: 11 % (ref 27–41)
MCH RBC QN AUTO: 36.2 PG (ref 27–31)
MCHC RBC AUTO-ENTMCNC: 34.3 G/DL (ref 33–37)
MCV RBC AUTO: 105.5 FL (ref 80–94)
METHADONE UR QL SCN: < 300
MONOCYTES # BLD AUTO: 0.7 10*3/UL (ref 0.1–1)
MONOCYTES NFR BLD MANUAL: 11.7 % (ref 3–9)
NEUT #: 4.6 10*3/UL (ref 2.3–7.9)
NEUT %: 73.6 % (ref 47–73)
NITRITE UR QL STRIP: NEGATIVE
NRBC BLD QL AUTO: 0 % (ref 0–0)
OPIATES UR QL SCN: < 300
PCP UR QL SCN: <  25
PH UR STRIP: 5.5 [PH] (ref 5–9)
PLATELET # BLD AUTO: 209 10*3/UL (ref 130–400)
PMV BLD AUTO: 10.3 FL (ref 9.6–12.3)
POTASSIUM SERPL-SCNC: 3.6 MMOL/L (ref 3.5–5.1)
PROT SERPL-MCNC: 6.5 GM/DL (ref 6.4–8.2)
RBC # BLD AUTO: 3.48 10*6/UL (ref 4.5–5.9)
SODIUM SERPL-SCNC: 134 MMOL/L (ref 136–145)
SP GR UR: 1.01 (ref 1–1.03)
TSH SERPL DL<=0.005 MIU/L-ACNC: 0.06 UIU/ML (ref 0.36–4.75)
UROBILINOGEN UR STRIP-MCNC: 0.2 E.U./DL (ref 0.2–1)
WBC NRBC COR # BLD AUTO: 6.3 10*3/UL (ref 4.8–10.8)

## 2018-06-20 VITALS — DIASTOLIC BLOOD PRESSURE: 90 MMHG

## 2018-06-20 VITALS — DIASTOLIC BLOOD PRESSURE: 64 MMHG | SYSTOLIC BLOOD PRESSURE: 92 MMHG

## 2018-06-20 VITALS — DIASTOLIC BLOOD PRESSURE: 98 MMHG

## 2018-06-20 VITALS — SYSTOLIC BLOOD PRESSURE: 145 MMHG | DIASTOLIC BLOOD PRESSURE: 95 MMHG

## 2018-06-20 VITALS — DIASTOLIC BLOOD PRESSURE: 89 MMHG | SYSTOLIC BLOOD PRESSURE: 129 MMHG

## 2018-06-20 VITALS — DIASTOLIC BLOOD PRESSURE: 77 MMHG

## 2018-06-20 VITALS — DIASTOLIC BLOOD PRESSURE: 86 MMHG

## 2018-06-20 VITALS — DIASTOLIC BLOOD PRESSURE: 62 MMHG

## 2018-06-20 LAB
APTT PPP: 28.8 SECONDS (ref 20.8–31.5)
BASOPHILS # BLD AUTO: 0 10*3/UL (ref 0–0.1)
BASOPHILS NFR BLD AUTO: 0.7 % (ref 0–1)
BUN SERPL-MCNC: 9 MG/DL (ref 7–24)
CHLORIDE SERPL-SCNC: 114 MMOL/L (ref 98–107)
CREAT SERPL-MCNC: 0.81 MG/DL (ref 0.7–1.3)
EOSINOPHIL # BLD AUTO: 0.1 10*3/UL (ref 0–0.4)
EOSINOPHIL # BLD AUTO: 3.2 % (ref 1–4)
ERYTHROCYTE [DISTWIDTH] IN BLOOD BY AUTOMATED COUNT: 13 % (ref 0–14.5)
HCT VFR BLD AUTO: 39.6 % (ref 42–52)
HGB BLD-MCNC: 13.6 G/DL (ref 14–18)
INR BLD: 1 (ref 2–3.5)
LYMPHOCYTES # BLD AUTO: 0.5 10*3/UL (ref 1.3–4.4)
LYMPHOCYTES NFR BLD AUTO: 10.3 % (ref 27–41)
MCH RBC QN AUTO: 36.9 PG (ref 27–31)
MCHC RBC AUTO-ENTMCNC: 34.3 G/DL (ref 33–37)
MCV RBC AUTO: 107.3 FL (ref 80–94)
MONOCYTES # BLD AUTO: 0.8 10*3/UL (ref 0.1–1)
MONOCYTES NFR BLD MANUAL: 17.4 % (ref 3–9)
NEUT #: 3 10*3/UL (ref 2.3–7.9)
NEUT %: 67.5 % (ref 47–73)
NRBC BLD QL AUTO: 0 % (ref 0–0)
PHOSPHATE SERPL-MCNC: 3.3 MG/DL (ref 2.5–4.9)
PLATELET # BLD AUTO: 245 10*3/UL (ref 130–400)
PMV BLD AUTO: 10.8 FL (ref 9.6–12.3)
POTASSIUM SERPL-SCNC: 4.3 MMOL/L (ref 3.5–5.1)
RBC # BLD AUTO: 3.69 10*6/UL (ref 4.5–5.9)
SODIUM SERPL-SCNC: 144 MMOL/L (ref 136–145)
T4 FREE SERPL-MCNC: 0.94 NG/DL (ref 0.76–1.46)
WBC NRBC COR # BLD AUTO: 4.4 10*3/UL (ref 4.8–10.8)

## 2018-06-21 VITALS — DIASTOLIC BLOOD PRESSURE: 76 MMHG

## 2018-06-21 VITALS — SYSTOLIC BLOOD PRESSURE: 114 MMHG | DIASTOLIC BLOOD PRESSURE: 91 MMHG

## 2018-06-21 VITALS — DIASTOLIC BLOOD PRESSURE: 73 MMHG

## 2018-06-21 VITALS — DIASTOLIC BLOOD PRESSURE: 89 MMHG | SYSTOLIC BLOOD PRESSURE: 118 MMHG

## 2018-06-21 VITALS — SYSTOLIC BLOOD PRESSURE: 119 MMHG | DIASTOLIC BLOOD PRESSURE: 90 MMHG

## 2018-06-21 VITALS — SYSTOLIC BLOOD PRESSURE: 100 MMHG | DIASTOLIC BLOOD PRESSURE: 76 MMHG

## 2018-06-21 VITALS — SYSTOLIC BLOOD PRESSURE: 107 MMHG | DIASTOLIC BLOOD PRESSURE: 75 MMHG

## 2018-06-21 VITALS — DIASTOLIC BLOOD PRESSURE: 80 MMHG

## 2018-06-21 VITALS — DIASTOLIC BLOOD PRESSURE: 77 MMHG

## 2018-06-21 VITALS — SYSTOLIC BLOOD PRESSURE: 117 MMHG | DIASTOLIC BLOOD PRESSURE: 76 MMHG

## 2018-06-21 VITALS — DIASTOLIC BLOOD PRESSURE: 92 MMHG | SYSTOLIC BLOOD PRESSURE: 121 MMHG

## 2018-06-21 VITALS — SYSTOLIC BLOOD PRESSURE: 108 MMHG | DIASTOLIC BLOOD PRESSURE: 82 MMHG

## 2018-06-21 VITALS — DIASTOLIC BLOOD PRESSURE: 82 MMHG

## 2018-06-21 VITALS — DIASTOLIC BLOOD PRESSURE: 81 MMHG

## 2018-06-21 VITALS — DIASTOLIC BLOOD PRESSURE: 68 MMHG

## 2018-06-21 LAB
ALBUMIN SERPL-MCNC: 2.8 GM/DL (ref 3.1–4.5)
ALP SERPL-CCNC: 53 U/L (ref 45–117)
ALT SERPL W P-5'-P-CCNC: 19 U/L (ref 12–78)
AST SERPL-CCNC: 11 IU/L (ref 3–35)
BASOPHILS # BLD AUTO: 1 % (ref 0–1)
BUN SERPL-MCNC: 11 MG/DL (ref 7–24)
CHLORIDE SERPL-SCNC: 112 MMOL/L (ref 98–107)
CREAT SERPL-MCNC: 0.81 MG/DL (ref 0.7–1.3)
ERYTHROCYTE [DISTWIDTH] IN BLOOD BY AUTOMATED COUNT: 13.3 % (ref 0–14.5)
HCT VFR BLD AUTO: 37.1 % (ref 42–52)
HGB BLD-MCNC: 12.6 G/DL (ref 14–18)
MACROCYTES BLD QL SMEAR: SLIGHT
MCH RBC QN AUTO: 36.7 PG (ref 27–31)
MCHC RBC AUTO-ENTMCNC: 34 G/DL (ref 33–37)
MCV RBC AUTO: 108.2 FL (ref 80–94)
NRBC BLD QL AUTO: 0 10*3/UL (ref 0–0)
PHOSPHATE SERPL-MCNC: 4 MG/DL (ref 2.5–4.9)
PLATELET # BLD AUTO: 217 10*3/UL (ref 130–400)
PLATELET SUFFICIENCY: NORMAL
PMV BLD AUTO: 11 FL (ref 9.6–12.3)
POTASSIUM SERPL-SCNC: 4 MMOL/L (ref 3.5–5.1)
PROT SERPL-MCNC: 5.9 GM/DL (ref 6.4–8.2)
RBC # BLD AUTO: 3.43 10*6/UL (ref 4.5–5.9)
SODIUM SERPL-SCNC: 145 MMOL/L (ref 136–145)
TOTAL CELLS COUNTED: 100 #CELLS
WBC NRBC COR # BLD AUTO: 3.8 10*3/UL (ref 4.8–10.8)

## 2018-06-22 VITALS — DIASTOLIC BLOOD PRESSURE: 79 MMHG | SYSTOLIC BLOOD PRESSURE: 114 MMHG

## 2018-06-22 VITALS — SYSTOLIC BLOOD PRESSURE: 114 MMHG | DIASTOLIC BLOOD PRESSURE: 78 MMHG

## 2018-06-22 VITALS — DIASTOLIC BLOOD PRESSURE: 81 MMHG | SYSTOLIC BLOOD PRESSURE: 112 MMHG

## 2018-06-22 VITALS — DIASTOLIC BLOOD PRESSURE: 85 MMHG | SYSTOLIC BLOOD PRESSURE: 121 MMHG

## 2018-06-22 VITALS — DIASTOLIC BLOOD PRESSURE: 83 MMHG

## 2018-06-22 VITALS — DIASTOLIC BLOOD PRESSURE: 86 MMHG

## 2018-06-22 VITALS — DIASTOLIC BLOOD PRESSURE: 90 MMHG

## 2018-06-22 VITALS — DIASTOLIC BLOOD PRESSURE: 76 MMHG | SYSTOLIC BLOOD PRESSURE: 111 MMHG

## 2018-06-23 VITALS — DIASTOLIC BLOOD PRESSURE: 100 MMHG | SYSTOLIC BLOOD PRESSURE: 140 MMHG

## 2018-06-23 VITALS — DIASTOLIC BLOOD PRESSURE: 86 MMHG | SYSTOLIC BLOOD PRESSURE: 118 MMHG

## 2018-06-23 VITALS — DIASTOLIC BLOOD PRESSURE: 81 MMHG

## 2018-06-23 VITALS — DIASTOLIC BLOOD PRESSURE: 95 MMHG

## 2018-06-23 VITALS — SYSTOLIC BLOOD PRESSURE: 136 MMHG | DIASTOLIC BLOOD PRESSURE: 86 MMHG

## 2018-06-23 VITALS — DIASTOLIC BLOOD PRESSURE: 87 MMHG | SYSTOLIC BLOOD PRESSURE: 134 MMHG

## 2018-06-24 VITALS — DIASTOLIC BLOOD PRESSURE: 100 MMHG | SYSTOLIC BLOOD PRESSURE: 127 MMHG

## 2018-06-24 VITALS — DIASTOLIC BLOOD PRESSURE: 87 MMHG | SYSTOLIC BLOOD PRESSURE: 139 MMHG

## 2018-06-24 VITALS — DIASTOLIC BLOOD PRESSURE: 76 MMHG

## 2018-06-24 VITALS — SYSTOLIC BLOOD PRESSURE: 124 MMHG | DIASTOLIC BLOOD PRESSURE: 97 MMHG

## 2018-06-24 VITALS — DIASTOLIC BLOOD PRESSURE: 90 MMHG | SYSTOLIC BLOOD PRESSURE: 109 MMHG

## 2018-06-24 VITALS — DIASTOLIC BLOOD PRESSURE: 88 MMHG | SYSTOLIC BLOOD PRESSURE: 114 MMHG

## 2018-06-24 LAB
BUN SERPL-MCNC: 20 MG/DL (ref 7–24)
CHLORIDE SERPL-SCNC: 107 MMOL/L (ref 98–107)
CREAT SERPL-MCNC: 0.92 MG/DL (ref 0.7–1.3)
PHOSPHATE SERPL-MCNC: 4.8 MG/DL (ref 2.5–4.9)
POTASSIUM SERPL-SCNC: 4.8 MMOL/L (ref 3.5–5.1)
SODIUM SERPL-SCNC: 143 MMOL/L (ref 136–145)

## 2018-06-25 VITALS — SYSTOLIC BLOOD PRESSURE: 136 MMHG | DIASTOLIC BLOOD PRESSURE: 95 MMHG

## 2018-06-25 VITALS — SYSTOLIC BLOOD PRESSURE: 110 MMHG | DIASTOLIC BLOOD PRESSURE: 81 MMHG

## 2018-06-25 VITALS — DIASTOLIC BLOOD PRESSURE: 91 MMHG

## 2018-06-25 LAB — DIGOXIN SERPL-MCNC: 0.36 NG/ML (ref 0.8–2)

## 2019-02-01 ENCOUNTER — HOSPITAL ENCOUNTER (OUTPATIENT)
Dept: HOSPITAL 83 - LAB | Age: 54
Discharge: HOME | End: 2019-02-01
Attending: NURSE PRACTITIONER
Payer: COMMERCIAL

## 2019-02-01 DIAGNOSIS — E03.9: ICD-10-CM

## 2019-02-01 DIAGNOSIS — E78.00: Primary | ICD-10-CM

## 2019-02-01 DIAGNOSIS — E55.9: ICD-10-CM

## 2019-02-01 DIAGNOSIS — F10.99: ICD-10-CM

## 2019-02-01 LAB
ALBUMIN SERPL-MCNC: 3.5 GM/DL (ref 3.1–4.5)
ALP SERPL-CCNC: 105 U/L (ref 45–117)
ALT SERPL W P-5'-P-CCNC: 22 U/L (ref 12–78)
AST SERPL-CCNC: 14 IU/L (ref 3–35)
BASOPHILS # BLD AUTO: 0.1 10*3/UL (ref 0–0.1)
BASOPHILS NFR BLD AUTO: 0.7 % (ref 0–1)
BUN SERPL-MCNC: 14 MG/DL (ref 7–24)
CHLORIDE SERPL-SCNC: 105 MMOL/L (ref 98–107)
CHOLEST SERPL-MCNC: 138 MG/DL (ref ?–200)
CREAT SERPL-MCNC: 1.3 MG/DL (ref 0.7–1.3)
EOSINOPHIL # BLD AUTO: 0.2 10*3/UL (ref 0–0.4)
EOSINOPHIL # BLD AUTO: 2.8 % (ref 1–4)
ERYTHROCYTE [DISTWIDTH] IN BLOOD BY AUTOMATED COUNT: 12.4 % (ref 0–14.5)
HCT VFR BLD AUTO: 49.1 % (ref 42–52)
HDLC SERPL-MCNC: 40 MG/DL (ref 40–60)
HGB BLD-MCNC: 17.4 G/DL (ref 14–18)
LDLC SERPL DIRECT ASSAY-MCNC: 71 MG/DL (ref 9–159)
LYMPHOCYTES # BLD AUTO: 1.3 10*3/UL (ref 1.3–4.4)
LYMPHOCYTES NFR BLD AUTO: 15.7 % (ref 27–41)
MCH RBC QN AUTO: 38 PG (ref 27–31)
MCHC RBC AUTO-ENTMCNC: 35.4 G/DL (ref 33–37)
MCV RBC AUTO: 107.2 FL (ref 80–94)
MONOCYTES # BLD AUTO: 1.1 10*3/UL (ref 0.1–1)
MONOCYTES NFR BLD MANUAL: 13.8 % (ref 3–9)
NEUT #: 5.4 10*3/UL (ref 2.3–7.9)
NEUT %: 66.5 % (ref 47–73)
NRBC BLD QL AUTO: 0 10*3/UL (ref 0–0)
PLATELET # BLD AUTO: 222 10*3/UL (ref 130–400)
PMV BLD AUTO: 11.2 FL (ref 9.6–12.3)
POTASSIUM SERPL-SCNC: 3.9 MMOL/L (ref 3.5–5.1)
PROT SERPL-MCNC: 7.9 GM/DL (ref 6.4–8.2)
RBC # BLD AUTO: 4.58 10*6/UL (ref 4.5–5.9)
SODIUM SERPL-SCNC: 138 MMOL/L (ref 136–145)
TRIGL SERPL-MCNC: 134 MG/DL (ref ?–150)
TSH SERPL DL<=0.005 MIU/L-ACNC: 0.9 UIU/ML (ref 0.36–4.75)
VLDLC SERPL CALC-MCNC: 27 MG/DL (ref 6–40)
WBC NRBC COR # BLD AUTO: 8.1 10*3/UL (ref 4.8–10.8)

## 2019-03-14 ENCOUNTER — HOSPITAL ENCOUNTER (OUTPATIENT)
Dept: HOSPITAL 83 - CT | Age: 54
Discharge: HOME | End: 2019-03-14
Attending: NURSE PRACTITIONER
Payer: COMMERCIAL

## 2019-03-14 DIAGNOSIS — I65.01: Primary | ICD-10-CM

## 2019-03-14 LAB — CREAT SERPL-MCNC: 1.05 MG/DL (ref 0.7–1.3)

## 2019-09-24 ENCOUNTER — HOSPITAL ENCOUNTER (OUTPATIENT)
Dept: HOSPITAL 83 - LAB | Age: 54
Discharge: HOME | End: 2019-09-24
Attending: NURSE PRACTITIONER
Payer: COMMERCIAL

## 2019-09-24 DIAGNOSIS — E03.9: Primary | ICD-10-CM

## 2019-09-24 DIAGNOSIS — E78.00: ICD-10-CM

## 2019-09-24 LAB
ALBUMIN SERPL-MCNC: 3.3 GM/DL (ref 3.1–4.5)
ALP SERPL-CCNC: 70 U/L (ref 45–117)
ALT SERPL W P-5'-P-CCNC: 16 U/L (ref 12–78)
AST SERPL-CCNC: 18 IU/L (ref 3–35)
BUN SERPL-MCNC: 5 MG/DL (ref 7–24)
CHLORIDE SERPL-SCNC: 103 MMOL/L (ref 98–107)
CHOLEST SERPL-MCNC: 173 MG/DL (ref ?–200)
CREAT SERPL-MCNC: 0.94 MG/DL (ref 0.7–1.3)
HDLC SERPL-MCNC: 92 MG/DL (ref 40–60)
LDLC SERPL DIRECT ASSAY-MCNC: 68 MG/DL (ref 9–159)
POTASSIUM SERPL-SCNC: 3.9 MMOL/L (ref 3.5–5.1)
PROT SERPL-MCNC: 7.3 GM/DL (ref 6.4–8.2)
SODIUM SERPL-SCNC: 137 MMOL/L (ref 136–145)
TRIGL SERPL-MCNC: 64 MG/DL (ref ?–150)
TSH SERPL DL<=0.005 MIU/L-ACNC: 4.25 UIU/ML (ref 0.36–4.75)
VLDLC SERPL CALC-MCNC: 13 MG/DL (ref 6–40)

## 2019-10-18 ENCOUNTER — HOSPITAL ENCOUNTER (INPATIENT)
Dept: HOSPITAL 83 - ED | Age: 54
LOS: 3 days | Discharge: HOME | DRG: 422 | End: 2019-10-21
Attending: EMERGENCY MEDICINE | Admitting: EMERGENCY MEDICINE
Payer: COMMERCIAL

## 2019-10-18 VITALS — DIASTOLIC BLOOD PRESSURE: 90 MMHG | SYSTOLIC BLOOD PRESSURE: 132 MMHG

## 2019-10-18 VITALS — DIASTOLIC BLOOD PRESSURE: 54 MMHG

## 2019-10-18 VITALS — DIASTOLIC BLOOD PRESSURE: 82 MMHG

## 2019-10-18 VITALS — HEIGHT: 70 IN | WEIGHT: 168.13 LBS | BODY MASS INDEX: 24.07 KG/M2

## 2019-10-18 VITALS — SYSTOLIC BLOOD PRESSURE: 102 MMHG | DIASTOLIC BLOOD PRESSURE: 52 MMHG

## 2019-10-18 VITALS — DIASTOLIC BLOOD PRESSURE: 62 MMHG | SYSTOLIC BLOOD PRESSURE: 100 MMHG

## 2019-10-18 VITALS — DIASTOLIC BLOOD PRESSURE: 68 MMHG

## 2019-10-18 VITALS — DIASTOLIC BLOOD PRESSURE: 70 MMHG

## 2019-10-18 DIAGNOSIS — G47.00: ICD-10-CM

## 2019-10-18 DIAGNOSIS — M19.90: ICD-10-CM

## 2019-10-18 DIAGNOSIS — Z85.819: ICD-10-CM

## 2019-10-18 DIAGNOSIS — E87.1: ICD-10-CM

## 2019-10-18 DIAGNOSIS — R00.0: ICD-10-CM

## 2019-10-18 DIAGNOSIS — I25.10: ICD-10-CM

## 2019-10-18 DIAGNOSIS — Z83.3: ICD-10-CM

## 2019-10-18 DIAGNOSIS — D53.9: ICD-10-CM

## 2019-10-18 DIAGNOSIS — I48.21: ICD-10-CM

## 2019-10-18 DIAGNOSIS — E87.2: ICD-10-CM

## 2019-10-18 DIAGNOSIS — F17.210: ICD-10-CM

## 2019-10-18 DIAGNOSIS — N17.0: ICD-10-CM

## 2019-10-18 DIAGNOSIS — E86.0: Primary | ICD-10-CM

## 2019-10-18 DIAGNOSIS — I10: ICD-10-CM

## 2019-10-18 DIAGNOSIS — Z79.899: ICD-10-CM

## 2019-10-18 DIAGNOSIS — Z85.89: ICD-10-CM

## 2019-10-18 DIAGNOSIS — F41.9: ICD-10-CM

## 2019-10-18 DIAGNOSIS — E86.9: ICD-10-CM

## 2019-10-18 DIAGNOSIS — E53.8: ICD-10-CM

## 2019-10-18 DIAGNOSIS — E55.9: ICD-10-CM

## 2019-10-18 DIAGNOSIS — E87.6: ICD-10-CM

## 2019-10-18 DIAGNOSIS — K57.90: ICD-10-CM

## 2019-10-18 DIAGNOSIS — R65.11: ICD-10-CM

## 2019-10-18 DIAGNOSIS — D69.6: ICD-10-CM

## 2019-10-18 DIAGNOSIS — G40.909: ICD-10-CM

## 2019-10-18 DIAGNOSIS — Z90.49: ICD-10-CM

## 2019-10-18 DIAGNOSIS — I95.1: ICD-10-CM

## 2019-10-18 DIAGNOSIS — Z71.6: ICD-10-CM

## 2019-10-18 DIAGNOSIS — Z84.89: ICD-10-CM

## 2019-10-18 DIAGNOSIS — J44.9: ICD-10-CM

## 2019-10-18 DIAGNOSIS — R73.9: ICD-10-CM

## 2019-10-18 DIAGNOSIS — Z82.49: ICD-10-CM

## 2019-10-18 DIAGNOSIS — F32.9: ICD-10-CM

## 2019-10-18 DIAGNOSIS — E03.9: ICD-10-CM

## 2019-10-18 LAB
ALBUMIN SERPL-MCNC: 3.4 GM/DL (ref 3.1–4.5)
ALP SERPL-CCNC: 66 U/L (ref 45–117)
ALT SERPL W P-5'-P-CCNC: 28 U/L (ref 12–78)
AST SERPL-CCNC: 23 IU/L (ref 3–35)
BUN SERPL-MCNC: 31 MG/DL (ref 7–24)
CHLORIDE SERPL-SCNC: 98 MMOL/L (ref 98–107)
CREAT SERPL-MCNC: 1.71 MG/DL (ref 0.7–1.3)
ERYTHROCYTE [DISTWIDTH] IN BLOOD BY AUTOMATED COUNT: 12.8 % (ref 0–14.5)
HCT VFR BLD AUTO: 40.3 % (ref 42–52)
HGB BLD-MCNC: 14.1 G/DL (ref 14–18)
INR BLD: 1.2 (ref 2–3.5)
MACROCYTES BLD QL SMEAR: (no result)
MCH RBC QN AUTO: 37 PG (ref 27–31)
MCHC RBC AUTO-ENTMCNC: 35 G/DL (ref 33–37)
MCV RBC AUTO: 105.8 FL (ref 80–94)
NRBC BLD QL AUTO: 0 % (ref 0–0)
PLATELET # BLD AUTO: 90 10*3/UL (ref 130–400)
PLATELET SUFFICIENCY: (no result)
PMV BLD AUTO: 11.8 FL (ref 9.6–12.3)
POTASSIUM SERPL-SCNC: 3.2 MMOL/L (ref 3.5–5.1)
PROT SERPL-MCNC: 7.6 GM/DL (ref 6.4–8.2)
RBC # BLD AUTO: 3.81 10*6/UL (ref 4.5–5.9)
SODIUM SERPL-SCNC: 134 MMOL/L (ref 136–145)
TOTAL CELLS COUNTED: 100 #CELLS
URATE SERPL-MCNC: 7.3 MG/DL (ref 3.5–7.2)
VARIANT LYMPHS NFR BLD MANUAL: 2 % (ref 0–0)
WBC NRBC COR # BLD AUTO: 5.2 10*3/UL (ref 4.8–10.8)

## 2019-10-18 NOTE — NUR
NOTIFIED DR BARROSO OF BP AND DIZZINESS X 1 WEEK. VERBAL ORDER GIVEN FOR NSS X
1 L WIDE OPEN. FLOAT RN IN ROOM PLACING IV AND AWARE OF NEED FOR IV FLUID BOLUS
ONCE IV PLACED. IV FLUID BAG IS SPIKED AND LINE PRIMED READY FOR
ADMINISTRATION AT BEDSIDE.

## 2019-10-18 NOTE — CON
[place], [place]
 
                                 REPORT OF CONSULTATION
 
        [facility] SCOTTIE KING                  [id number]  
        UNIT #: [id number]                         ROOM: 526       
        DOCTOR: BOOM PERDOMO MD                 BIRTHDATE: [birth year]
 
 
DOS: 10/21/2019
 
HISTORY OF PRESENT ILLNESS:  This is a 54-year-old -American man with a
history of alcoholism, who claimed that he had not drank any alcohol one week
before he was admitted to the hospital.  He has COPD and still smokes, history
of coronary artery disease, depression, fatty liver, hiatus hernia, head and
neck cancer, essential hypertension, hypothyroidism, osteoarthritis, vitamin D
deficiency, and seizure disorder.  He still smokes cigarettes and still drinks
alcohol, but had not had any former week or so before he was admission.  He has
chronic atrial fibrillation and never had a stroke.
 
He was admitted to the hospital because he had been nauseated and had watery
diarrhea for about 3 days.  When he stood up, he felt lightheaded and dizzy, but
did not quite pass out.  He had to sit down.  This happened repeatedly.  He was
unsteady as well.  He had no palpitations, chest pain, or heaviness.  He did not
have any abdominal cramps.  No fever or chills.
 
Since admission, he has been given lot of IV fluids and oral supplements as
well.  Diarrhea seemed to have settled down.
 
PHYSICAL EXAMINATION:
GENERAL:  This reveals a patient who is alert and oriented.  His complexion is
fine.
VITAL SIGNS:  Temperature is 97.8 degrees Fahrenheit, pulse is 88 and regular,
blood pressure 132/80.
NECK:  JVP is rather low.
CARDIAC:  Auscultation revealed no murmurs or rubs.  There is no carotid bruit.
EXTREMITIES:  He has good pedal pulses and no edema in the lower extremities.
RESPIRATORY:  He has rhonchi and some crackles with moderately reduced breath
sounds bilaterally.
ABDOMEN:  Bowel sounds are present.  The liver is not enlarged and there is no
guarding or rigidity.
 
LABORATORY DATA:  Hemoglobin is 11.9 grams today, was 14.1 gram on the day of
admission.  Renal function was normal yesterday.  Potassium is 3.5.
 
IMPRESSION:
1.  This patient had presyncope, most likely from orthostatic hypotension. 
Despite IV fluids, he still seems volume depleted and oral and IV fluids should
be encouraged.
2.  Chronic atrial fibrillation is present and rate is controlled.  He takes
Xarelto at home.  I think that should be continued along with metoprolol that he
has been on for a long time.
 
No cardiac workup is necessary.
 
I thank for this consultation.
 
 
 
 
                              [place], [place]
 
                                 REPORT OF CONSULTATION
 
        NAME: SCOTTIE KING                  ACCT #: [id number]  
        UNIT #: [id number]                         ROOM: 526       
        DOCTOR: BOOM PERDOMO MD                 BIRTHDATE: [birth year]
 
 
_________________________________
BOOM PERDOMO MD
 
CM:CONSTR:REPORT OF CONSULTATION
 
D: 10/21/19 1113   T: 10/21/19                           
10/21/19     1148                                          interface

## 2019-10-18 NOTE — EKG
[place], [place]
 
                               ELECTROCARDIOGRAM REPORT
 
        NAME: SCOTTIE KING                 ACCT #: [id number]  
        UNIT #: [id number]                        ROOM: 526       
        DOCTOR: GIDEON DRAFT REPORT          BIRTHDATE: [birth year]
 
 
 

 
 
                           [facility]
                                       
Test Date:    2019-10-18               Test Time:    13:14:12
Pat Name:     SCOTTIE KING           Department:   
Patient ID:   [id number]             Room:         526
Gender:       M                        Technician:   KAYLA
:          1965               Requested By: JUSTEN BARROSO
Order Number: [id number]      Reading MD:   Melvin Murguia MD
                                 Measurements
Intervals                              Axis          
Rate:         123                      P:            
MD:                                    QRS:          61
QRSD:         91                       T:            -63
QT:           321                                    
QTc:          460                                    
                           Interpretive Statements
Atrial fibrillation
Ventricular premature complex
Borderline low voltage, extremity leads
Borderline ST depression, diffuse leads
Baseline wander in lead(s) V2,V3
 
 
Electronically Signed On 10- 14:11:22 PDT by Melvin Murguia MD
 
CM:EKGRPT:ELECTROCARDIOGRAM REPORT
 
D: 10/18/19 1314
T: 10/19/19 1411
    
JUSTEN ADORNO DRAFT REPORT         
JUSTEN BARROSO DO

## 2019-10-18 NOTE — NUR
A 54, admitted to 5E, under the
services of KATELYN Smith DO with a diagnosis of DEHYDRATION.
Chief complaint is FALL X2, DIZZINESS.
Patient arrived via ambulatory from ER.
Monitor applied. Initial assessment completed.
Vital signs taken and recorded.
KATELYN SMITH DO notified of admission to the unit.
Orders received.
See assessment for past medical history, medications
and allergies.
Patient and/or family oriented to unit. [facility]
visitation policy reviewed.
Clothing/patient valuable form completed.
 
TIN LEONARDO

## 2019-10-19 VITALS — SYSTOLIC BLOOD PRESSURE: 136 MMHG | DIASTOLIC BLOOD PRESSURE: 90 MMHG

## 2019-10-19 VITALS — SYSTOLIC BLOOD PRESSURE: 138 MMHG | DIASTOLIC BLOOD PRESSURE: 94 MMHG

## 2019-10-19 VITALS — DIASTOLIC BLOOD PRESSURE: 76 MMHG

## 2019-10-19 VITALS — DIASTOLIC BLOOD PRESSURE: 60 MMHG

## 2019-10-19 VITALS — SYSTOLIC BLOOD PRESSURE: 137 MMHG | DIASTOLIC BLOOD PRESSURE: 95 MMHG

## 2019-10-19 LAB
25(OH)D3 SERPL-MCNC: 19.4 NG/ML (ref 30–100)
APPEARANCE UR: (no result)
BACTERIA #/AREA URNS HPF: (no result) /[HPF]
BASOPHILS # BLD AUTO: 0 10*3/UL (ref 0–0.1)
BASOPHILS NFR BLD AUTO: 1 % (ref 0–1)
BILIRUB UR QL STRIP: (no result)
BUN SERPL-MCNC: 29 MG/DL (ref 7–24)
CHLORIDE SERPL-SCNC: 107 MMOL/L (ref 98–107)
CHOLEST SERPL-MCNC: 108 MG/DL (ref ?–200)
COLOR UR: YELLOW
CREAT SERPL-MCNC: 1.37 MG/DL (ref 0.7–1.3)
EOSINOPHIL # BLD AUTO: 0.2 10*3/UL (ref 0–0.4)
EOSINOPHIL # BLD AUTO: 4 % (ref 1–4)
ERYTHROCYTE [DISTWIDTH] IN BLOOD BY AUTOMATED COUNT: 13.1 % (ref 0–14.5)
GLUCOSE UR QL: NEGATIVE
HCT VFR BLD AUTO: 36.8 % (ref 42–52)
HDLC SERPL-MCNC: 38 MG/DL (ref 40–60)
HGB BLD-MCNC: 12.5 G/DL (ref 14–18)
HGB UR QL STRIP: NEGATIVE
KETONES UR QL STRIP: (no result)
LDLC SERPL DIRECT ASSAY-MCNC: 59 MG/DL (ref 9–159)
LEUKOCYTE ESTERASE UR QL STRIP: NEGATIVE
LYMPHOCYTES # BLD AUTO: 0.9 10*3/UL (ref 1.3–4.4)
LYMPHOCYTES NFR BLD AUTO: 22.1 % (ref 27–41)
MCH RBC QN AUTO: 36.1 PG (ref 27–31)
MCHC RBC AUTO-ENTMCNC: 34 G/DL (ref 33–37)
MCV RBC AUTO: 106.4 FL (ref 80–94)
MONOCYTES # BLD AUTO: 0.5 10*3/UL (ref 0.1–1)
MONOCYTES NFR BLD MANUAL: 13.2 % (ref 3–9)
MUCOUS THREADS URNS QL MICRO: (no result)
NEUT #: 2.4 10*3/UL (ref 2.3–7.9)
NEUT %: 59.2 % (ref 47–73)
NITRITE UR QL STRIP: NEGATIVE
NRBC BLD QL AUTO: 0 10*3/UL (ref 0–0)
PH UR STRIP: 5.5 [PH] (ref 5–9)
PLATELET # BLD AUTO: 92 10*3/UL (ref 130–400)
PMV BLD AUTO: 13 FL (ref 9.6–12.3)
POTASSIUM SERPL-SCNC: 3.5 MMOL/L (ref 3.5–5.1)
RBC # BLD AUTO: 3.46 10*6/UL (ref 4.5–5.9)
RBC #/AREA URNS HPF: (no result) RBC/HPF (ref 0–2)
SODIUM SERPL-SCNC: 138 MMOL/L (ref 136–145)
SP GR UR: 1.02 (ref 1–1.03)
TRIGL SERPL-MCNC: 53 MG/DL (ref ?–150)
UROBILINOGEN UR STRIP-MCNC: 2 E.U./DL (ref 0.2–1)
VITAMIN B12: 697 PG/ML (ref 247–911)
VLDLC SERPL CALC-MCNC: 11 MG/DL (ref 6–40)
WBC #/AREA URNS HPF: (no result) WBC/HPF (ref 0–5)
WBC NRBC COR # BLD AUTO: 4 10*3/UL (ref 4.8–10.8)

## 2019-10-19 NOTE — NUR
Patient resting quietly with no c/o discomfort. Respirations easy and regular.
Vital signs stable. No overt distress.
KATHERYN HAYDEN

## 2019-10-19 NOTE — NUR
EXPLAINED TO PATIENT ABOUT DRVandana ORDERING FOR HIM TO BE STRAIGHT CATHERIZED.
PT. ADAMANTLY REFUSES TO BE STRAIGHT CATHERIZED.  PT. STATES "WHAT IS THE BIG
HURRY?"  EXPLAINED TO PATIENT THAT HE  CC'S OF URINE IN HIS BLADDER &
THAT HE HAS BEEN RECEIVING IV FLUIDS  CC'S HOUR.  PT. STILL REFUSES.

## 2019-10-19 NOTE — NUR
PATIENT ASSESSMENT COMPLETED AT THIS TIME WITHOUT INCIDENT. PATIENT DENIES ANY
CHEST PAIN OR SHORTNESS OF BREATH AT THIS TIME. NEGATIVE ASSESSMENT AT THIS
TIME. CALL LIGHT WITHIN REACH WILL CONTINUE TO MONTIOR.

## 2019-10-20 VITALS — DIASTOLIC BLOOD PRESSURE: 88 MMHG | SYSTOLIC BLOOD PRESSURE: 137 MMHG

## 2019-10-20 VITALS — DIASTOLIC BLOOD PRESSURE: 98 MMHG | SYSTOLIC BLOOD PRESSURE: 152 MMHG

## 2019-10-20 VITALS — SYSTOLIC BLOOD PRESSURE: 158 MMHG | DIASTOLIC BLOOD PRESSURE: 90 MMHG

## 2019-10-20 VITALS — DIASTOLIC BLOOD PRESSURE: 98 MMHG | SYSTOLIC BLOOD PRESSURE: 164 MMHG

## 2019-10-20 VITALS — DIASTOLIC BLOOD PRESSURE: 84 MMHG

## 2019-10-20 LAB
BASOPHILS # BLD AUTO: 1 % (ref 0–1)
BUN SERPL-MCNC: 20 MG/DL (ref 7–24)
CHLORIDE SERPL-SCNC: 106 MMOL/L (ref 98–107)
CREAT SERPL-MCNC: 1.06 MG/DL (ref 0.7–1.3)
ERYTHROCYTE [DISTWIDTH] IN BLOOD BY AUTOMATED COUNT: 13.1 % (ref 0–14.5)
HCT VFR BLD AUTO: 35.2 % (ref 42–52)
HGB BLD-MCNC: 11.9 G/DL (ref 14–18)
MACROCYTES BLD QL SMEAR: (no result)
MCH RBC QN AUTO: 36.7 PG (ref 27–31)
MCHC RBC AUTO-ENTMCNC: 33.8 G/DL (ref 33–37)
MCV RBC AUTO: 108.6 FL (ref 80–94)
NRBC BLD QL AUTO: 0 10*3/UL (ref 0–0)
PLATELET # BLD AUTO: 91 10*3/UL (ref 130–400)
PLATELET SUFFICIENCY: (no result)
PMV BLD AUTO: 12.2 FL (ref 9.6–12.3)
POTASSIUM SERPL-SCNC: 3.5 MMOL/L (ref 3.5–5.1)
RBC # BLD AUTO: 3.24 10*6/UL (ref 4.5–5.9)
SODIUM SERPL-SCNC: 137 MMOL/L (ref 136–145)
TOTAL CELLS COUNTED: 100 #CELLS
WBC NRBC COR # BLD AUTO: 4.5 10*3/UL (ref 4.8–10.8)

## 2019-10-20 NOTE — NUR
PATIENT AWAKE AT THIS TIME TO USE URINAL AND REQUESTED THAT HIS SOUP BE HEATED
UP AT THIS TIME. PATIENT DENIED ANY DISTRESS AT THIS TIME. CALL LIGHT WITHIN
REACH. SOUP HEATED FOR PATIENT. WILL CONTINUE TO MONITOR.

## 2019-10-20 NOTE — NUR
WAS IN TO SEE THE PATIENT. PATIENT STATED HE SEES  ALREADY.
NEW CONSULT WAS PUT IN FOR HIS GROUP. SPOKE WITH DR. GARCIA REGARDING NEW
CONSULT. NO NEW ORDERS RECIEVED AT THIS TIME.

## 2019-10-20 NOTE — NUR
PATIENT ASSESSMENT COMPLETED AT THIS TIME WITHOUT INCIDENT. PATIENT DENIES ANY
DISTRESS OR PAIN AT THIS TIME. A&O X3, SEATED UP IN BED WATCHING FOOTBALL
GAME. MANUAL B/P 158/90, PATIENT DENIES ANY HEADACHE, BLURRED VISION, OR
DIZZINESS. CALL LIGHT WITHIN REACH, WILL CONTINUE TO MONITOR.

## 2019-10-20 NOTE — NUR
Patient resting quietly with no c/o discomfort. Respirations easy and regular.
Vital signs stable. No overt distress.
HISSOM,MARILEE

## 2019-10-20 NOTE — NUR
PATIENT COMPLAINS OF INDIGESTION. MEDICATED WITH TUMS. VERBALIZED RELIEF.
VOICES NO OTHER CONCERNS AT THIS TIME. RESTING IN BED. CALL LIGHT WITHIN REACH

## 2019-10-21 VITALS — SYSTOLIC BLOOD PRESSURE: 132 MMHG | DIASTOLIC BLOOD PRESSURE: 80 MMHG

## 2019-10-21 VITALS — SYSTOLIC BLOOD PRESSURE: 150 MMHG | DIASTOLIC BLOOD PRESSURE: 92 MMHG

## 2019-10-21 VITALS — DIASTOLIC BLOOD PRESSURE: 89 MMHG

## 2019-10-21 NOTE — NUR
Occupational Therapy evaluation completed on 5 with full eval to follow.
Precautions include dizziness, fall at home prior to admission, bed alarm,IV
UE,inconsitant heart rate with any activity. Low complexity level 72591.
Recommend no further OT and return  home at [facility]. Thank you.
Lesia Del Rio OTr/L

## 2019-10-21 NOTE — NUR
PATIENT RESTING IN A POSITION OF COMFORT IN BED AT THIS TIME. RESPIRATIONS
EASY AND NONLABORED AT THIS TIME. CALL LIGHT WITHIN REACH, WILL CONTINUE TO
MONITOR.

## 2019-10-21 NOTE — NUR
Discharge instructions reviewed with patient/family. Patient receptive and
verbalizes understanding. Follow-up care arranged. Written instructions given
to patient/family.
MARILEE HOOPER

## 2019-10-21 NOTE — NUR
PHYSICAL THERAPY
 
Physical therapy evaluation completed, 5E. Full details/evaluation to follow.
Low complexity determined after chart review/evaluation, 41847. Pt has no PT
needs. Recommend returning home at discharge. Thank you
 
Renetta Mello, PT, DPT

## 2019-10-21 NOTE — NUR
PATIENT RESTING IN A POSITION OF COMFORT IN BED, RESPIRATIONS EASY AND
NONLABORED AT THIS TIME. CALL LIGHT WITHIN REACH, WILL CONTINUE TO MONITOR

## 2019-10-21 NOTE — NUR
in to talk to patient.
Patient states lives at home with his mother.
There are 0 steps in the home.
Physician: Richard Clay
Pharmacy: [facility]
Home health services: none
Patient's level of ADLs: INDEPENDENT
Patient has working utilities: yes
DME: none
Follow-up physician's appointment after d/c: will be made by the hospitalist
nurse director upon discharge
Does patient want to access PORTAL?: no
Discharge plan discussed with patient. He lives at home with his mother. He is
independent in his ADLs and ambulation. Discussed home health care services and
he denies any home needs at this time. When medically stable he will be
discharged to home. His mother will provide transportation on discharge.
 
VARGAS SOTOMAYOR

## 2019-10-21 NOTE — NUR
SPOKE WITH DR HAWTHORNE. WANTED TO KNOW IF NURSE COULD CONTACT DR PERDOMO TO
SEE IF PATIENT COULD BE DISCHARGED.

## 2019-10-22 ENCOUNTER — HOSPITAL ENCOUNTER (OUTPATIENT)
Dept: HOSPITAL 83 - LAB | Age: 54
Discharge: HOME | End: 2019-10-22
Attending: INTERNAL MEDICINE
Payer: COMMERCIAL

## 2019-10-22 DIAGNOSIS — D69.6: ICD-10-CM

## 2019-10-22 DIAGNOSIS — N17.0: Primary | ICD-10-CM

## 2019-10-22 DIAGNOSIS — E86.0: ICD-10-CM

## 2019-10-22 LAB
BASOPHILS # BLD AUTO: 1 % (ref 0–1)
ERYTHROCYTE [DISTWIDTH] IN BLOOD BY AUTOMATED COUNT: 13.1 % (ref 0–14.5)
HCT VFR BLD AUTO: 36.8 % (ref 42–52)
HGB BLD-MCNC: 12.9 G/DL (ref 14–18)
MCH RBC QN AUTO: 37.5 PG (ref 27–31)
MCHC RBC AUTO-ENTMCNC: 35.1 G/DL (ref 33–37)
MCV RBC AUTO: 107 FL (ref 80–94)
NRBC BLD QL AUTO: 0 % (ref 0–0)
PLATELET # BLD AUTO: 150 10*3/UL (ref 130–400)
PLATELET SUFFICIENCY: NORMAL
PMV BLD AUTO: 10.7 FL (ref 9.6–12.3)
RBC # BLD AUTO: 3.44 10*6/UL (ref 4.5–5.9)
RBC MORPH BLD: NORMAL
TOTAL CELLS COUNTED: 100 #CELLS
WBC NRBC COR # BLD AUTO: 5.1 10*3/UL (ref 4.8–10.8)

## 2019-10-24 ENCOUNTER — HOSPITAL ENCOUNTER (OUTPATIENT)
Dept: HOSPITAL 83 - LAB | Age: 54
Discharge: HOME | End: 2019-10-24
Attending: INTERNAL MEDICINE
Payer: COMMERCIAL

## 2019-10-24 DIAGNOSIS — N17.0: Primary | ICD-10-CM

## 2019-10-24 DIAGNOSIS — E86.0: ICD-10-CM

## 2019-10-24 DIAGNOSIS — D69.9: ICD-10-CM

## 2019-10-24 LAB
BASOPHILS # BLD AUTO: 2 % (ref 0–1)
BUN SERPL-MCNC: 10 MG/DL (ref 7–24)
CHLORIDE SERPL-SCNC: 108 MMOL/L (ref 98–107)
CREAT SERPL-MCNC: 0.98 MG/DL (ref 0.7–1.3)
ERYTHROCYTE [DISTWIDTH] IN BLOOD BY AUTOMATED COUNT: 13.1 % (ref 0–14.5)
HCT VFR BLD AUTO: 37.2 % (ref 42–52)
HGB BLD-MCNC: 12.6 G/DL (ref 14–18)
MACROCYTES BLD QL SMEAR: SLIGHT
MCH RBC QN AUTO: 36 PG (ref 27–31)
MCHC RBC AUTO-ENTMCNC: 33.9 G/DL (ref 33–37)
MCV RBC AUTO: 106.3 FL (ref 80–94)
NRBC BLD QL AUTO: 0 10*3/UL (ref 0–0)
PLATELET # BLD AUTO: 221 10*3/UL (ref 130–400)
PLATELET SUFFICIENCY: NORMAL
PMV BLD AUTO: 10.3 FL (ref 9.6–12.3)
POTASSIUM SERPL-SCNC: 4 MMOL/L (ref 3.5–5.1)
RBC # BLD AUTO: 3.5 10*6/UL (ref 4.5–5.9)
SODIUM SERPL-SCNC: 141 MMOL/L (ref 136–145)
TOTAL CELLS COUNTED: 100 #CELLS
WBC NRBC COR # BLD AUTO: 4.2 10*3/UL (ref 4.8–10.8)

## 2020-02-15 ENCOUNTER — HOSPITAL ENCOUNTER (INPATIENT)
Dept: HOSPITAL 83 - ED | Age: 55
LOS: 7 days | Discharge: HOME | DRG: 720 | End: 2020-02-22
Attending: INTERNAL MEDICINE | Admitting: INTERNAL MEDICINE
Payer: COMMERCIAL

## 2020-02-15 VITALS — WEIGHT: 171.44 LBS | BODY MASS INDEX: 24.54 KG/M2 | HEIGHT: 70 IN

## 2020-02-15 VITALS — DIASTOLIC BLOOD PRESSURE: 71 MMHG

## 2020-02-15 VITALS — DIASTOLIC BLOOD PRESSURE: 75 MMHG

## 2020-02-15 VITALS — SYSTOLIC BLOOD PRESSURE: 119 MMHG | DIASTOLIC BLOOD PRESSURE: 88 MMHG

## 2020-02-15 DIAGNOSIS — G47.00: ICD-10-CM

## 2020-02-15 DIAGNOSIS — Z79.899: ICD-10-CM

## 2020-02-15 DIAGNOSIS — J44.1: ICD-10-CM
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
## 2020-02-15 DIAGNOSIS — F10.231: ICD-10-CM

## 2020-02-15 DIAGNOSIS — E87.8: ICD-10-CM

## 2020-02-15 DIAGNOSIS — Z85.89: ICD-10-CM

## 2020-02-15 DIAGNOSIS — M85.80: ICD-10-CM

## 2020-02-15 DIAGNOSIS — N17.0: ICD-10-CM

## 2020-02-15 DIAGNOSIS — D53.9: ICD-10-CM

## 2020-02-15 DIAGNOSIS — I48.21: ICD-10-CM

## 2020-02-15 DIAGNOSIS — M19.90: ICD-10-CM

## 2020-02-15 DIAGNOSIS — I25.10: ICD-10-CM

## 2020-02-15 DIAGNOSIS — F10.229: ICD-10-CM

## 2020-02-15 DIAGNOSIS — F41.9: ICD-10-CM

## 2020-02-15 DIAGNOSIS — E89.0: ICD-10-CM

## 2020-02-15 DIAGNOSIS — E87.1: ICD-10-CM

## 2020-02-15 DIAGNOSIS — Z83.79: ICD-10-CM

## 2020-02-15 DIAGNOSIS — J44.0: ICD-10-CM

## 2020-02-15 DIAGNOSIS — F17.210: ICD-10-CM

## 2020-02-15 DIAGNOSIS — A41.9: Primary | ICD-10-CM

## 2020-02-15 DIAGNOSIS — J18.9: ICD-10-CM

## 2020-02-15 DIAGNOSIS — Z71.6: ICD-10-CM

## 2020-02-15 DIAGNOSIS — I10: ICD-10-CM

## 2020-02-15 DIAGNOSIS — E83.42: ICD-10-CM

## 2020-02-15 DIAGNOSIS — Z83.49: ICD-10-CM

## 2020-02-15 DIAGNOSIS — E53.8: ICD-10-CM

## 2020-02-15 DIAGNOSIS — K57.90: ICD-10-CM

## 2020-02-15 DIAGNOSIS — F33.1: ICD-10-CM

## 2020-02-15 LAB
ALBUMIN SERPL-MCNC: 3.3 GM/DL (ref 3.1–4.5)
ALP SERPL-CCNC: 88 U/L (ref 45–117)
ALT SERPL W P-5'-P-CCNC: 21 U/L (ref 12–78)
AMPHETAMINES UR QL SCN: < 1000
APAP SERPL-MCNC: < 5 UG/ML (ref 10–30)
APTT PPP: 34.7 SECONDS (ref 20–32.1)
AST SERPL-CCNC: 23 IU/L (ref 3–35)
BARBITURATES UR QL SCN: < 200
BASOPHILS # BLD AUTO: 0.1 10*3/UL (ref 0–0.1)
BASOPHILS NFR BLD AUTO: 0.7 % (ref 0–1)
BENZODIAZ UR QL SCN: < 200
BUN SERPL-MCNC: 17 MG/DL (ref 7–24)
BZE UR QL SCN: < 300
CANNABINOIDS UR QL SCN: < 50
CHLORIDE SERPL-SCNC: 99 MMOL/L (ref 98–107)
CREAT SERPL-MCNC: 1.21 MG/DL (ref 0.7–1.3)
EOSINOPHIL # BLD AUTO: 0.3 10*3/UL (ref 0–0.4)
EOSINOPHIL # BLD AUTO: 4.8 % (ref 1–4)
ERYTHROCYTE [DISTWIDTH] IN BLOOD BY AUTOMATED COUNT: 14.9 % (ref 0–14.5)
ETHANOL SERPL-MCNC: 119 MG/DL (ref ?–3)
HCT VFR BLD AUTO: 38.1 % (ref 42–52)
HGB BLD-MCNC: 13.2 G/DL (ref 14–18)
INR BLD: 1.2 (ref 2–3.5)
LYMPHOCYTES # BLD AUTO: 1.1 10*3/UL (ref 1.3–4.4)
LYMPHOCYTES NFR BLD AUTO: 16.3 % (ref 27–41)
MCH RBC QN AUTO: 37.2 PG (ref 27–31)
MCHC RBC AUTO-ENTMCNC: 34.6 G/DL (ref 33–37)
MCV RBC AUTO: 107.3 FL (ref 80–94)
METHADONE UR QL SCN: < 300
MONOCYTES # BLD AUTO: 0.8 10*3/UL (ref 0.1–1)
MONOCYTES NFR BLD MANUAL: 12 % (ref 3–9)
NEUT #: 4.6 10*3/UL (ref 2.3–7.9)
NEUT %: 65.9 % (ref 47–73)
NRBC BLD QL AUTO: 0 % (ref 0–0)
OPIATES UR QL SCN: < 300
PCP UR QL SCN: <  25
PLATELET # BLD AUTO: 96 10*3/UL (ref 130–400)
PMV BLD AUTO: 12 FL (ref 9.6–12.3)
POTASSIUM SERPL-SCNC: 3.9 MMOL/L (ref 3.5–5.1)
PROT SERPL-MCNC: 7.4 GM/DL (ref 6.4–8.2)
RBC # BLD AUTO: 3.55 10*6/UL (ref 4.5–5.9)
SODIUM SERPL-SCNC: 133 MMOL/L (ref 136–145)
TROPONIN I SERPL-MCNC: < 0.015 NG/ML (ref ?–0.04)
TSH SERPL DL<=0.005 MIU/L-ACNC: 0.12 UIU/ML (ref 0.36–4.75)
WBC NRBC COR # BLD AUTO: 6.9 10*3/UL (ref 4.8–10.8)

## 2020-02-16 VITALS — DIASTOLIC BLOOD PRESSURE: 82 MMHG

## 2020-02-16 VITALS — DIASTOLIC BLOOD PRESSURE: 99 MMHG | SYSTOLIC BLOOD PRESSURE: 141 MMHG

## 2020-02-16 VITALS — DIASTOLIC BLOOD PRESSURE: 85 MMHG

## 2020-02-16 VITALS — DIASTOLIC BLOOD PRESSURE: 88 MMHG

## 2020-02-16 VITALS — SYSTOLIC BLOOD PRESSURE: 134 MMHG | DIASTOLIC BLOOD PRESSURE: 98 MMHG

## 2020-02-16 VITALS — DIASTOLIC BLOOD PRESSURE: 96 MMHG

## 2020-02-16 LAB
APPEARANCE UR: (no result)
APTT PPP: 32.3 SECONDS (ref 20–32.1)
BACTERIA #/AREA URNS HPF: (no result) /[HPF]
BASOPHILS # BLD AUTO: 0 10*3/UL (ref 0–0.1)
BASOPHILS NFR BLD AUTO: 0.6 % (ref 0–1)
BILIRUB UR QL STRIP: NEGATIVE
BUN SERPL-MCNC: 16 MG/DL (ref 7–24)
CHLORIDE SERPL-SCNC: 109 MMOL/L (ref 98–107)
COLOR UR: YELLOW
CREAT SERPL-MCNC: 0.98 MG/DL (ref 0.7–1.3)
EOSINOPHIL # BLD AUTO: 0.3 10*3/UL (ref 0–0.4)
EOSINOPHIL # BLD AUTO: 6 % (ref 1–4)
EPI CELLS #/AREA URNS HPF: (no result) /[HPF]
ERYTHROCYTE [DISTWIDTH] IN BLOOD BY AUTOMATED COUNT: 15 % (ref 0–14.5)
GLUCOSE UR QL: NEGATIVE
HCT VFR BLD AUTO: 36.1 % (ref 42–52)
HGB BLD-MCNC: 12.2 G/DL (ref 14–18)
HGB UR QL STRIP: NEGATIVE
INR BLD: 1.1 (ref 2–3.5)
KETONES UR QL STRIP: NEGATIVE
LEUKOCYTE ESTERASE UR QL STRIP: NEGATIVE
LYMPHOCYTES # BLD AUTO: 0.9 10*3/UL (ref 1.3–4.4)
LYMPHOCYTES NFR BLD AUTO: 18.3 % (ref 27–41)
MCH RBC QN AUTO: 36.1 PG (ref 27–31)
MCHC RBC AUTO-ENTMCNC: 33.8 G/DL (ref 33–37)
MCV RBC AUTO: 106.8 FL (ref 80–94)
MONOCYTES # BLD AUTO: 0.7 10*3/UL (ref 0.1–1)
MONOCYTES NFR BLD MANUAL: 14.7 % (ref 3–9)
NEUT #: 2.8 10*3/UL (ref 2.3–7.9)
NEUT %: 60 % (ref 47–73)
NITRITE UR QL STRIP: NEGATIVE
NRBC BLD QL AUTO: 0 10*3/UL (ref 0–0)
PH UR STRIP: 5 [PH] (ref 5–9)
PHOSPHATE SERPL-MCNC: 4.4 MG/DL (ref 2.5–4.9)
PLATELET # BLD AUTO: 82 10*3/UL (ref 130–400)
PMV BLD AUTO: 12.5 FL (ref 9.6–12.3)
POTASSIUM SERPL-SCNC: 3.8 MMOL/L (ref 3.5–5.1)
RBC # BLD AUTO: 3.38 10*6/UL (ref 4.5–5.9)
RBC #/AREA URNS HPF: (no result) RBC/HPF (ref 0–2)
SODIUM SERPL-SCNC: 139 MMOL/L (ref 136–145)
SP GR UR: 1.01 (ref 1–1.03)
UROBILINOGEN UR STRIP-MCNC: 0.2 E.U./DL (ref 0.2–1)
WBC #/AREA URNS HPF: (no result) WBC/HPF (ref 0–5)
WBC NRBC COR # BLD AUTO: 4.6 10*3/UL (ref 4.8–10.8)

## 2020-02-16 NOTE — NUR
PT RESTING IN BED. NO COMPLAINTS AT THIS TIME. ASSESSMENT COMPLETE.
REPIRATIONS EASY AND REGULAR. CALL LIGHT IN REACH. WILL MONITOR.

## 2020-02-16 NOTE — NUR
PT RESTING IN BED. NO COMPLAINTS AT THIS TIME. RESPIRATIONS EASY AND REGULAR.
CALL LIGHT WITHIN REACH. WILL CONTINUE TO MONITOR.

## 2020-02-16 NOTE — NUR
ORTHO'S DONE ON PATIENT WERE NEGATIVE. PATIENT RESTING IN BED QUIETLY. NO
DISTRESS NOTED. PATIENT DENIES ANY PAIN. CALL LIGHT WITHIN REACH. WILL
CONTINUE TO MONITOR.

## 2020-02-16 NOTE — NUR
A 54, admitted to , under the
services of CLAUDIO Jordan DO with a diagnosis of DIZZINESS/ ETOH ABUSE.
Chief complaint is DIZZINESS.
Patient arrived via bed from ER.
Monitor applied. Initial assessment completed.
Vital signs taken and recorded.
CLAUDIO JORDAN DO notified of admission to the unit.
Orders received.
See assessment for past medical history, medications
and allergies.
Patient and/or family oriented to unit. [facility]
visitation policy reviewed.
Clothing/patient valuable form completed.
 
RACHEL OVALLE

## 2020-02-16 NOTE — NUR
PT TAKEN TO DECON ROOM AND SHOWERED.BELONGINGS PLACED IN DOUBLE BAGGED YELLOW
INFECTIOUS BAG AND LABELED.

## 2020-02-17 VITALS — SYSTOLIC BLOOD PRESSURE: 129 MMHG | DIASTOLIC BLOOD PRESSURE: 83 MMHG

## 2020-02-17 VITALS — DIASTOLIC BLOOD PRESSURE: 86 MMHG | SYSTOLIC BLOOD PRESSURE: 138 MMHG

## 2020-02-17 VITALS — DIASTOLIC BLOOD PRESSURE: 100 MMHG | SYSTOLIC BLOOD PRESSURE: 168 MMHG

## 2020-02-17 VITALS — DIASTOLIC BLOOD PRESSURE: 100 MMHG

## 2020-02-17 VITALS — SYSTOLIC BLOOD PRESSURE: 140 MMHG | DIASTOLIC BLOOD PRESSURE: 88 MMHG

## 2020-02-17 VITALS — DIASTOLIC BLOOD PRESSURE: 91 MMHG

## 2020-02-17 LAB
BUN SERPL-MCNC: 17 MG/DL (ref 7–24)
CHLORIDE SERPL-SCNC: 107 MMOL/L (ref 98–107)
CREAT SERPL-MCNC: 0.96 MG/DL (ref 0.7–1.3)
ERYTHROCYTE [DISTWIDTH] IN BLOOD BY AUTOMATED COUNT: 14.7 % (ref 0–14.5)
HCT VFR BLD AUTO: 33.8 % (ref 42–52)
HGB BLD-MCNC: 11.5 G/DL (ref 14–18)
MACROCYTES BLD QL SMEAR: SLIGHT
MCH RBC QN AUTO: 36.1 PG (ref 27–31)
MCHC RBC AUTO-ENTMCNC: 34 G/DL (ref 33–37)
MCV RBC AUTO: 106 FL (ref 80–94)
NRBC BLD QL AUTO: 0 10*3/UL (ref 0–0)
PLATELET # BLD AUTO: 86 10*3/UL (ref 130–400)
PLATELET SUFFICIENCY: (no result)
PMV BLD AUTO: 12.7 FL (ref 9.6–12.3)
POLYCHROMASIA BLD QL SMEAR: SLIGHT
POTASSIUM SERPL-SCNC: 4 MMOL/L (ref 3.5–5.1)
RBC # BLD AUTO: 3.19 10*6/UL (ref 4.5–5.9)
SODIUM SERPL-SCNC: 137 MMOL/L (ref 136–145)
TARGETS BLD QL SMEAR: (no result)
TOTAL CELLS COUNTED: 100 #CELLS
WBC NRBC COR # BLD AUTO: 9.7 10*3/UL (ref 4.8–10.8)

## 2020-02-17 NOTE — NUR
PATIENT BED ALARM SOUNDING AND UPON ENTERING THE ROOM PATIENT WAS NOTED
SITTING ON THE END OF THE BED ATTEMPTING TO GET OUT OF BED TO AMBULATE TO THE
CLOSET FOR A CAN OF POP. UPON STANDING IT WAS NOTED THAT THE PATIENT WAS VERY
UNSTEADY AND FELL BACKWARDS ONTO THE BED WITHOUT INJURY. PATIENT DID AMBULATE
TO THE CLOSET AND RETRIEVED HIS CAN OF POP, A LIGHTER AND CIGAR AND STATED
THAT HE WAS "GOING OUTSIDE AND SMOKE A CIGAR AND WOULD BE RIGHT BACK IN",
ADVISED THE PATIENT THAT HE COULD NOT LEAVE THE FLOOR AND THAT THIS WAS A
NON-SMOKING FACILITY. PATIENT BECAME VERBALLY BELLIGERENT AND STATED THAT "I
CAN DO WHATEVER I FUCKING WANT TO SO I'LL JUST GOD DAMN LEAVE THIS [address] [address]
AND WALK TO MY COUSIN'S BEHIND THE DIALYSIS CENTER". SPOKE WITH PATIENT ABOUT
HOW UNSTEADY HE WAS JUST NOW WHEN WALKING IN THE ROOM AND THAT IF HE WAS
INTENDING TO LEAVE THAT HE NEEDED TO CALL AND HAVE SOMEONE COME AND PICK HIM
UP SINCE HE WAS UNSTEADY WHEN WALKING. PATIENT PROCEEDED TO GET BACK INTO BED
AND COVER HIMSELF UP AND STATED "I'LL STAY UNTIL THE MORNING THEN I'M OUT OF
HERE." BED ALARM PLACED BACK ON AND CALL LIGHT WITHIN REACH, ADVISED PATIENT
TO PLEASE CALL FOR ASSISTANCE IF HE NEEDED TO GO TO THE BATHROOM OR CLOSET
SINCE HE WAS UNSTEADY. WILL CONTINUE TO MONITOR.

## 2020-02-17 NOTE — NUR
PATIENTS VITALS AND ASSESSMENT COMPLETED AND DOCUMENTED.  PT TOLERATED WELL.
PATIENT IN BED WATCHING TV.  NO COMPLAINTS AT THIS TIME.  REPORT GIVEN TO
PÉREZ VELASQUEZ SPNADINECC

## 2020-02-17 NOTE — NUR
NOTIFIED PHARMACY THAT PATIENT'S ATIVAN TAPER NEEDED VERIFIED. [facility] HAD PUT IN A DUPLICATE ORDER FOR ATIVAN AT 0000 AND IT HAD TO BE
RETIMED FOR 0600 TODAY.

## 2020-02-17 NOTE — NUR
PATIENT HAD DUPLICATE ORDER OF ATIVAN SCHEDULED FOR 0000. RETIMED SECOND TAPER
TO START AT 0600 PER ORDER.

## 2020-02-17 NOTE — NUR
Occupational THerapy:
Screen and referral received for occupational therapy evaluation. Thank you,
Huong Sanchez OTR/L

## 2020-02-17 NOTE — NUR
PHYSICAL THERAPY
 
[place] completed moderate level of complexity 84688 would benefit from SNF prior
to home pt was dizzy/orthostatic having LOB with standing using urinal. Spoke
with nsg reg BP's. Full report to follow PT to work on transfers, amb and
strengthening.
 
Concepción Ladd PT

## 2020-02-17 NOTE — NUR
Occupational Therapy evaluation completed on the 4th floor with full eval to
follow. Precautions: IV site/pole, bed alarm, syncope. Low complexity level.
Recommend: may benefit from SNF, if not agreeable home health with OT
Thank you for this referral, Huong Sanchez OTR/L

## 2020-02-17 NOTE — NUR
in to talk to patient.
Patient states lives at home with alone.
There are no steps in the home.
Physician: melina meier
[facility]
Home health services: none
Patient's level of ADLs: MINIMAL ASSIST
Patient has working utilities: all working
DME: none
Follow-up physician's appointment after d/c: will be made by hospitalist nurse
director upon discharge
Does patient want to access PORTAL?: no
Discharge plan discussed with patient, he states he lives at home alone, he
states he is normally independent in adls and ambulation, but sometimes has
difficulty ambulation, he stated he was getting a cane to use when needed,
discussed with him a short term skilled nursing for rehab prior to returning
home, he was not receptive to this, stated he did not want to go to a SNF and
would be returning home, also discussed with him [facility] and he declines any home
services at this time, case management will follow.
 
XIMENA TATE

## 2020-02-17 NOTE — NUR
PATIENT /100.  DENIES DIZZINESS OR HEADACHE.  NEUROS INTACT EXCEPT FOR
SLIGHT TREMORS IN RUBY HANDS AND ANXIETY.  ATIVAN GIVEN AS SCHEDULED.
LOPRESSOR 50MG GIVEN EARLY.  FLOOR NURSE AWARE.
FLORINA ZAMORA

## 2020-02-17 NOTE — NUR
PATIENT COMFORTABLE IN BED VISITING WITH FAMILY.  PATIENT HAS NO COMPLAINTS AT
THIS TIME.
FLORINA VELASQUEZ [facility]

## 2020-02-17 NOTE — NUR
PT RESTING IN BED. NO COMPLAINTS AT THIS TIME. BED ALARM ON. ASSESSMENT
COMPLETE. RESPIRATIONS EASY AND REGULAR. CALL LIGHT WITHIN REACH. WILL
CONTINUE TO MONITOR.

## 2020-02-17 NOTE — NUR
Occupational Therapy evaluation completed on the 4th floor with full eval to
follow. Precautions: IV site/pole, bed alarm, syncope. Low complexity level.
Recommend: Home with supports as needed for ADLs/IADLs,  OT if patient
agreeable. Thank you for this referral, Huong Sanchez OTR/L

## 2020-02-17 NOTE — NUR
MANUAL /100.  PATIENT DENIES DIZZINESS OR HEADACHE.  NEUROS INTACT
EXCEPT FOR INCREASE IN ANXIETY AND SLIGHT TREMORS OF RUBY HANDS.  ATIVAN GIVEN
AS SCHEDULED.  LOPRESSOR 50MG PO GIVEN EARLY.  FLOOR NURSE AWARE.
FLORINA ZAMORA

## 2020-02-17 NOTE — NUR
VITALS AND ASSESSMENT COMPLETED AND DOCUMENTED.  PATIENT TOLERATED WELL.
PATIENT IN BED EATING BREAKFAST AND WATCHING TV.  DENIES ANY PAIN.
FLORINA VELASQUEZ SPNRCC

## 2020-02-18 VITALS — SYSTOLIC BLOOD PRESSURE: 171 MMHG | DIASTOLIC BLOOD PRESSURE: 97 MMHG

## 2020-02-18 VITALS — SYSTOLIC BLOOD PRESSURE: 158 MMHG | DIASTOLIC BLOOD PRESSURE: 101 MMHG

## 2020-02-18 VITALS — DIASTOLIC BLOOD PRESSURE: 88 MMHG

## 2020-02-18 VITALS — SYSTOLIC BLOOD PRESSURE: 145 MMHG | DIASTOLIC BLOOD PRESSURE: 97 MMHG

## 2020-02-18 VITALS — SYSTOLIC BLOOD PRESSURE: 128 MMHG | DIASTOLIC BLOOD PRESSURE: 93 MMHG

## 2020-02-18 VITALS — DIASTOLIC BLOOD PRESSURE: 99 MMHG

## 2020-02-18 NOTE — NUR
PT BECOMING MORE RESTLESS AND AGITATED. TRYING TO GET OUT OF BED. RAMBLING
INCOHERENTLY. HALLUCINATING ABOUT BEING AT THE RACES RIGHT NOW. ATIVAN 1MG IV
GIVEN AT THIS TIME.

## 2020-02-18 NOTE — NUR
PHYSICAL THERAPY
 
[facility] will required new orders
for PT when medically stable, thank you
 
 
Concepción Ladd PT

## 2020-02-18 NOTE — NUR
EARLIER MEDICATION WAS NOT EFFECTIVE, PATIENT CONTINUES TO TREMOR AND ACT
AGITATED. PULLLING AT WRIST RESTRAINTS AND KICKING LEGSABOUT IN BED.

## 2020-02-18 NOTE — NUR
PT HAS BEEN RESTING QUIETLY SINCE BEING MEDICATED WITH ATIVAN AT 0852.  SOFT
WRIST RESTRAINTS REMAIN ON. NO ANKLE RESTRAINTS AT THIS TIME. WILL CONTINUE TO
MONITOR CLOSELY. IN CONSTANT VIEW OF [facility] STAFF.

## 2020-02-18 NOTE — NUR
PRN IV ATIVAN GIVEN FOR INCREASING RESTLESSNESS & TRYING TO CLIMB OUT OF BED..
UNABLE TO REORIENT,. PO LOPRESSOR GUIVEN WITH APPLE SAUCE TO DECREASE
ASPIRATION. 3 POINT RESTRAINTS IN EFFECT AS PATIENT PULLING AT LINES & TRYING
TO HIT STAFF.

## 2020-02-18 NOTE — NUR
PT RESTRAINTS UNSTRAPPED FROM BED AND PT GIVEN FULL LINEN CHANGE DUE TO
INCONTINENT CARE. 4 STAFF MEMBERS NEEDED TO KEEP PT FROM HITTING AND KICKING
WHILE LINENS BEING CHANGED. BILATERAL WRIST RESTRAINTS REAPPLIED.

## 2020-02-18 NOTE — NUR
PATIENT AGITATED, YELLING, ATTEMPTING TO GET OUT OF BED, SWEARING AND SAYING
THAT HE IS LEAVING. PATIENT HAS VISIBLE TREMORS TO THE HANDS AND LEGS.
MEDICATED WITH ATIVAN PER DRS ORDERS. WILL MONITOR

## 2020-02-18 NOTE — NUR
IV ATIVAN GIVEN, PATIENT STARTING TO THRASH IN BED AND STARTING TO YELL AT
STAFF AND IS HAVING VISUAL HALLUCINATIONS OF VARIOUS
FAMILY MEMBERS IN THE ROOM. WILL CONTINUE TO MONITOR AND REASSESS. PATIENT
RESPIRATIONS HAVE INCREASED AND FREQUENCY AND SOUND LABOROUS AT THIS TIME. 2L
NC PLACED ON PT AT THIS TIME.

## 2020-02-18 NOTE — NUR
PATIENT RESTLESS AND AGITATED WITH SLURRED SPEECH AND TREMORS. PATIENT
REPEATEDLY TRYING TO GET OUT OF BED AND PULL OUT IV SITE. IV ATIVAN GIVEN 3
TIMES SO FAR EVERY 15 MINUTES [facility] PATIENT PLACED IN SOFT
RESTRAINTS TO PREVENT HARM TO SELF AND OTHERS. PATIENT IN DIRECT LINE OF
SIGHT. WILL CONTINUE TO MONITOR.

## 2020-02-18 NOTE — NUR
PATIENT IS HAVING AUDIO-VISUAL HALLUCINATIONS, TREMORS, AND BECOMING
INCREASINGLY VIOLENT. PATIENT THREW CAN OF GINGER ALE AT STAFF STATING "I AM
FUCKING GOING TO MY APARTMENT IT IS A 20 MINUTE WALK." EXPLAINED TO PATIENT
THAT HE IS IN NO CONDITION TO GO HOME HE CAN BARELY WALK FROM BED TO DOORWAY
WITH OUT FALLING. PATIENT ESCORTED BACK TO BED WHERE PATIENT ATTEMPTED TO HIT
THIS STAFF MEMEBER. CALL PLACED TO DR. PEARL AND SHAHBAZ Q1H ORDERED AND
PATIENT TRANSFERED BACK TO UNIT.

## 2020-02-19 VITALS — DIASTOLIC BLOOD PRESSURE: 79 MMHG | SYSTOLIC BLOOD PRESSURE: 109 MMHG

## 2020-02-19 VITALS — DIASTOLIC BLOOD PRESSURE: 59 MMHG | SYSTOLIC BLOOD PRESSURE: 95 MMHG

## 2020-02-19 VITALS — SYSTOLIC BLOOD PRESSURE: 154 MMHG | DIASTOLIC BLOOD PRESSURE: 82 MMHG

## 2020-02-19 VITALS — SYSTOLIC BLOOD PRESSURE: 105 MMHG | DIASTOLIC BLOOD PRESSURE: 81 MMHG

## 2020-02-19 VITALS — DIASTOLIC BLOOD PRESSURE: 98 MMHG

## 2020-02-19 VITALS — DIASTOLIC BLOOD PRESSURE: 80 MMHG

## 2020-02-19 LAB
ALBUMIN SERPL-MCNC: 3 GM/DL (ref 3.1–4.5)
ALP SERPL-CCNC: 66 U/L (ref 45–117)
ALT SERPL W P-5'-P-CCNC: 20 U/L (ref 12–78)
AST SERPL-CCNC: 15 IU/L (ref 3–35)
BUN SERPL-MCNC: 19 MG/DL (ref 7–24)
CHLORIDE SERPL-SCNC: 106 MMOL/L (ref 98–107)
CREAT SERPL-MCNC: 0.99 MG/DL (ref 0.7–1.3)
ERYTHROCYTE [DISTWIDTH] IN BLOOD BY AUTOMATED COUNT: 15.1 % (ref 0–14.5)
HCT VFR BLD AUTO: 39.1 % (ref 42–52)
HGB BLD-MCNC: 13.3 G/DL (ref 14–18)
MACROCYTES BLD QL SMEAR: SLIGHT
MCH RBC QN AUTO: 36.4 PG (ref 27–31)
MCHC RBC AUTO-ENTMCNC: 34 G/DL (ref 33–37)
MCV RBC AUTO: 107.1 FL (ref 80–94)
NRBC BLD QL AUTO: 0 10*3/UL (ref 0–0)
PLATELET # BLD AUTO: 113 10*3/UL (ref 130–400)
PLATELET SUFFICIENCY: (no result)
PMV BLD AUTO: 12.6 FL (ref 9.6–12.3)
POLYCHROMASIA BLD QL SMEAR: SLIGHT
POTASSIUM SERPL-SCNC: 3.7 MMOL/L (ref 3.5–5.1)
PROT SERPL-MCNC: 6.9 GM/DL (ref 6.4–8.2)
RBC # BLD AUTO: 3.65 10*6/UL (ref 4.5–5.9)
SODIUM SERPL-SCNC: 139 MMOL/L (ref 136–145)
TARGETS BLD QL SMEAR: (no result)
TOTAL CELLS COUNTED: 100 #CELLS
WBC NRBC COR # BLD AUTO: 8.3 10*3/UL (ref 4.8–10.8)

## 2020-02-19 NOTE — NUR
MEDICATED WITH ATIVAN FOR INCREASED RESTLESSNESS, REQUESTING BEER, AND ARGUING
THAT HE IS NOT AT THE HOSPITAL

## 2020-02-19 NOTE — NUR
BECOMING MORE RESTLESS. STATES " I'VE GOTTA GO. MY BROTHER'S HERE TO TAKE ME
HOME." ALERT TO SELF AND PLACE. ATIVAN 1MG PO GIVEN FOR AGITATION. WILL
MONITOR. BED ALARM INTACT.

## 2020-02-19 NOTE — NUR
May convert patient to PO antibiotics if clinically feasible - all criteria
met for IV to PO conversion.
 
Thanks,
Harry Spann, PharmD, [facility]

## 2020-02-19 NOTE — NUR
RESTRAINTS DISCONTINUED AT THIS TIME. PATIENT ALERT AND ORIENTED. BEING
COOPERATIVE AT THIS TIME. WILL CONTINUE TO MONITOR.

## 2020-02-19 NOTE — NUR
PATIENT MEDICATED WITH ATIVAN PER DRS ORDERS FOR TREMORS TO THE BILATERAL
HANDS, AND INCREASED AGITATION. PATIENT IS YELLING OUT AND PULLING AT WRIST
RESTRAINTS. RN WILL MONIOTOR

## 2020-02-19 NOTE — NUR
Patient resting quietly with no c/o discomfort. Respirations easy and regular.
Vital signs stable. No overt distress. EARLIER MEDICATION SEEMS EFFECTIVE
TRAE KEYS

## 2020-02-20 VITALS — DIASTOLIC BLOOD PRESSURE: 85 MMHG

## 2020-02-20 VITALS — DIASTOLIC BLOOD PRESSURE: 91 MMHG

## 2020-02-20 VITALS — DIASTOLIC BLOOD PRESSURE: 81 MMHG

## 2020-02-20 VITALS — DIASTOLIC BLOOD PRESSURE: 86 MMHG

## 2020-02-20 VITALS — DIASTOLIC BLOOD PRESSURE: 108 MMHG

## 2020-02-20 VITALS — SYSTOLIC BLOOD PRESSURE: 128 MMHG | DIASTOLIC BLOOD PRESSURE: 94 MMHG

## 2020-02-20 LAB
ALBUMIN SERPL-MCNC: 2.7 GM/DL (ref 3.1–4.5)
ALP SERPL-CCNC: 64 U/L (ref 45–117)
ALT SERPL W P-5'-P-CCNC: 25 U/L (ref 12–78)
AST SERPL-CCNC: 16 IU/L (ref 3–35)
BASOPHILS # BLD AUTO: 0 10*3/UL (ref 0–0.1)
BASOPHILS NFR BLD AUTO: 0.1 % (ref 0–1)
BUN SERPL-MCNC: 42 MG/DL (ref 7–24)
CHLORIDE SERPL-SCNC: 108 MMOL/L (ref 98–107)
CREAT SERPL-MCNC: 1.43 MG/DL (ref 0.7–1.3)
EOSINOPHIL # BLD AUTO: 0.2 10*3/UL (ref 0–0.4)
EOSINOPHIL # BLD AUTO: 1.9 % (ref 1–4)
ERYTHROCYTE [DISTWIDTH] IN BLOOD BY AUTOMATED COUNT: 15.3 % (ref 0–14.5)
HCT VFR BLD AUTO: 39.6 % (ref 42–52)
HGB BLD-MCNC: 13.2 G/DL (ref 14–18)
LYMPHOCYTES # BLD AUTO: 0.6 10*3/UL (ref 1.3–4.4)
LYMPHOCYTES NFR BLD AUTO: 6 % (ref 27–41)
MACROCYTES BLD QL SMEAR: (no result)
MCH RBC QN AUTO: 36.4 PG (ref 27–31)
MCHC RBC AUTO-ENTMCNC: 33.3 G/DL (ref 33–37)
MCV RBC AUTO: 109.1 FL (ref 80–94)
MONOCYTES # BLD AUTO: 1.3 10*3/UL (ref 0.1–1)
MONOCYTES NFR BLD MANUAL: 14 % (ref 3–9)
NEUT #: 7.2 10*3/UL (ref 2.3–7.9)
NEUT %: 77.5 % (ref 47–73)
NRBC BLD QL AUTO: 0 10*3/UL (ref 0–0)
PLATELET # BLD AUTO: 158 10*3/UL (ref 130–400)
PLATELET SUFFICIENCY: NORMAL
PMV BLD AUTO: 12.5 FL (ref 9.6–12.3)
POTASSIUM SERPL-SCNC: 3.5 MMOL/L (ref 3.5–5.1)
PROT SERPL-MCNC: 6.5 GM/DL (ref 6.4–8.2)
RBC # BLD AUTO: 3.63 10*6/UL (ref 4.5–5.9)
SODIUM SERPL-SCNC: 140 MMOL/L (ref 136–145)
TOTAL CELLS COUNTED: 100 #CELLS
WBC NRBC COR # BLD AUTO: 9.3 10*3/UL (ref 4.8–10.8)

## 2020-02-20 NOTE — NUR
DR LEMUS IN TO SEE PT. PT'S HEART RATE GOING UP 'S AT TIMES.  ATIVAN
1MG PO GIVEN PER ORDER OF DR LEMUS.

## 2020-02-20 NOTE — NUR
Patients full referral faxed to [place] [facility] for review as a back
up plan.

## 2020-02-20 NOTE — NUR
1945 RESTING IN BED WITH HOB ELEVATED. SIDE RAILS UP X'S 2. CALL LIGHT IN
REACH. PT IS ALERT AND PLEASANT AT PRESENT TIME. PULSE OX 97% ON RA. NO
DISTRESS NOTED. NO C/O'S VOICED AT PRESENT. HEP LOCK INTACT. BED ALARM INTACT.
URINAL AT BEDSIDE.

## 2020-02-20 NOTE — NUR
EARLIER MEDS WERE EFFECTIVE. RESTING IN BED WITH EYES CLOSED. APPEARS TO BE
SLEEPING. BED ALARM INTACT. NO DISTRESS NOTED.

## 2020-02-20 NOTE — NUR
PT CALM AND COOPERATIVE AT THIS TIME. STATED VISTARIL WAS EFFECTIVE IN EASING
HIS ANXIETY. % ON BREAKFAST THIS AM.

## 2020-02-20 NOTE — NUR
UP TO BSC WITH 2 ASSISTS FOR LARGE BM HARD STOOL. INCONTINENT OF LARGE AMOUNT
URINE. ADULT DIAPER CHANGED. BACK TO BED. CALL LIGHT IN REACH. BOXED LUNCH
OBTAINED FOR PT PER REQUSET. BED ALARM INTACT.

## 2020-02-21 VITALS — DIASTOLIC BLOOD PRESSURE: 101 MMHG

## 2020-02-21 VITALS — DIASTOLIC BLOOD PRESSURE: 90 MMHG | SYSTOLIC BLOOD PRESSURE: 145 MMHG

## 2020-02-21 VITALS — DIASTOLIC BLOOD PRESSURE: 91 MMHG | SYSTOLIC BLOOD PRESSURE: 128 MMHG

## 2020-02-21 VITALS — SYSTOLIC BLOOD PRESSURE: 164 MMHG | DIASTOLIC BLOOD PRESSURE: 122 MMHG

## 2020-02-21 VITALS — DIASTOLIC BLOOD PRESSURE: 92 MMHG

## 2020-02-21 VITALS — DIASTOLIC BLOOD PRESSURE: 99 MMHG | SYSTOLIC BLOOD PRESSURE: 123 MMHG

## 2020-02-21 VITALS — DIASTOLIC BLOOD PRESSURE: 91 MMHG

## 2020-02-21 LAB
BUN SERPL-MCNC: 40 MG/DL (ref 7–24)
CHLORIDE SERPL-SCNC: 111 MMOL/L (ref 98–107)
CREAT SERPL-MCNC: 1.31 MG/DL (ref 0.7–1.3)
ERYTHROCYTE [DISTWIDTH] IN BLOOD BY AUTOMATED COUNT: 14.8 % (ref 0–14.5)
HCT VFR BLD AUTO: 38.8 % (ref 42–52)
HGB BLD-MCNC: 13 G/DL (ref 14–18)
MACROCYTES BLD QL SMEAR: (no result)
MCH RBC QN AUTO: 36.5 PG (ref 27–31)
MCHC RBC AUTO-ENTMCNC: 33.5 G/DL (ref 33–37)
MCV RBC AUTO: 109 FL (ref 80–94)
NRBC BLD QL AUTO: 0 % (ref 0–0)
PLATELET # BLD AUTO: 188 10*3/UL (ref 130–400)
PLATELET SUFFICIENCY: NORMAL
PMV BLD AUTO: 11.5 FL (ref 9.6–12.3)
POTASSIUM SERPL-SCNC: 4.3 MMOL/L (ref 3.5–5.1)
RBC # BLD AUTO: 3.56 10*6/UL (ref 4.5–5.9)
SODIUM SERPL-SCNC: 142 MMOL/L (ref 136–145)
TOTAL CELLS COUNTED: 100 #CELLS
WBC NRBC COR # BLD AUTO: 9.8 10*3/UL (ref 4.8–10.8)

## 2020-02-21 NOTE — NUR
[facility] WITHOUT DIFFICULTY. REPORT RECIEVED FROM
ABIDA JUAN RN.

## 2020-02-21 NOTE — NUR
PT RESTING IN BED. RESP-EASY AND FEGULAR. NO C/O AT THIS TIME. CALL LIGHT IN
REACH. SEE SHIFT ASSESSMENT.

## 2020-02-21 NOTE — NUR
PHYSICAL THERAPY
 
Re-Eval completed moderate level of complexity [id number] continue to recomend SNF
at discharge PT to work on tranfers,amb,balance,safety
 
 
Concepción Ladd PT

## 2020-02-21 NOTE — NUR
Occupational Therapy evaluation completed in [facility] with full eval to follow.
Recommend OT per POC and SNF to enable safe return home alone. Thank you for
this referral.
Lesia Del Rio OTR/L

## 2020-02-22 VITALS — DIASTOLIC BLOOD PRESSURE: 98 MMHG

## 2020-02-22 VITALS — DIASTOLIC BLOOD PRESSURE: 84 MMHG

## 2020-02-22 LAB
BUN SERPL-MCNC: 41 MG/DL (ref 7–24)
CHLORIDE SERPL-SCNC: 108 MMOL/L (ref 98–107)
CREAT SERPL-MCNC: 1.09 MG/DL (ref 0.7–1.3)
POTASSIUM SERPL-SCNC: 3.8 MMOL/L (ref 3.5–5.1)
SODIUM SERPL-SCNC: 139 MMOL/L (ref 136–145)

## 2020-02-22 NOTE — NUR
PT RESTING IN BED. RESP-EASY AND REGULAR. NO C/O AT THIS TIME. CALL LIGHT IN
REACH. SEE SHIFT ASSESSMENT.

## 2020-02-22 NOTE — NUR
Discharge instructions reviewed with patient/family. Patient receptive and
verbalizes understanding. Follow-up care arranged. Written instructions given
to patient/family. HEPLOCK AND CARDIAC MONITOR DISCONTINUED.
JUANPABLO MURRAY

## 2020-02-24 ENCOUNTER — HOSPITAL ENCOUNTER (EMERGENCY)
Dept: HOSPITAL 83 - ED | Age: 55
LOS: 1 days | Discharge: LEFT BEFORE DISCHARGE | End: 2020-02-25
Payer: COMMERCIAL

## 2020-02-24 VITALS — WEIGHT: 172 LBS | HEIGHT: 68.98 IN | BODY MASS INDEX: 25.48 KG/M2

## 2020-02-24 DIAGNOSIS — I48.91: ICD-10-CM

## 2020-02-24 DIAGNOSIS — Z79.899: ICD-10-CM

## 2020-02-24 DIAGNOSIS — I25.10: ICD-10-CM

## 2020-02-24 DIAGNOSIS — M85.80: ICD-10-CM

## 2020-02-24 DIAGNOSIS — M19.90: ICD-10-CM

## 2020-02-24 DIAGNOSIS — E03.9: ICD-10-CM

## 2020-02-24 DIAGNOSIS — J44.9: ICD-10-CM

## 2020-02-24 DIAGNOSIS — R62.7: Primary | ICD-10-CM

## 2020-02-24 DIAGNOSIS — R06.02: ICD-10-CM

## 2020-02-24 DIAGNOSIS — I10: ICD-10-CM

## 2020-02-24 DIAGNOSIS — R42: ICD-10-CM

## 2020-02-24 DIAGNOSIS — F17.200: ICD-10-CM

## 2020-02-24 LAB
ALBUMIN SERPL-MCNC: 3.2 GM/DL (ref 3.1–4.5)
ALP SERPL-CCNC: 92 U/L (ref 45–117)
ALT SERPL W P-5'-P-CCNC: 59 U/L (ref 12–78)
APTT PPP: 31.9 SECONDS (ref 20–32.1)
AST SERPL-CCNC: 30 IU/L (ref 3–35)
BUN SERPL-MCNC: 26 MG/DL (ref 7–24)
CHLORIDE SERPL-SCNC: 106 MMOL/L (ref 98–107)
CREAT SERPL-MCNC: 1.24 MG/DL (ref 0.7–1.3)
ERYTHROCYTE [DISTWIDTH] IN BLOOD BY AUTOMATED COUNT: 14.7 % (ref 0–14.5)
HCT VFR BLD AUTO: 41.8 % (ref 42–52)
HGB BLD-MCNC: 14.1 G/DL (ref 14–18)
INR BLD: 1.1 (ref 2–3.5)
MCH RBC QN AUTO: 36.9 PG (ref 27–31)
MCHC RBC AUTO-ENTMCNC: 33.7 G/DL (ref 33–37)
MCV RBC AUTO: 109.4 FL (ref 80–94)
NRBC BLD QL AUTO: 0 10*3/UL (ref 0–0)
PLATELET # BLD AUTO: 311 10*3/UL (ref 130–400)
PLATELET SUFFICIENCY: NORMAL
PMV BLD AUTO: 10.8 FL (ref 9.6–12.3)
POTASSIUM SERPL-SCNC: 3.8 MMOL/L (ref 3.5–5.1)
PROT SERPL-MCNC: 7.7 GM/DL (ref 6.4–8.2)
RBC # BLD AUTO: 3.82 10*6/UL (ref 4.5–5.9)
RBC MORPH BLD: NORMAL
SODIUM SERPL-SCNC: 138 MMOL/L (ref 136–145)
TOTAL CELLS COUNTED: 100 #CELLS
TROPONIN I SERPL-MCNC: < 0.015 NG/ML (ref ?–0.04)
WBC NRBC COR # BLD AUTO: 10.4 10*3/UL (ref 4.8–10.8)

## 2020-02-25 VITALS — DIASTOLIC BLOOD PRESSURE: 90 MMHG | SYSTOLIC BLOOD PRESSURE: 134 MMHG

## 2020-04-25 ENCOUNTER — HOSPITAL ENCOUNTER (INPATIENT)
Dept: HOSPITAL 83 - ED | Age: 55
LOS: 4 days | Discharge: OTHER INSTITUTION | End: 2020-04-29
Attending: INTERNAL MEDICINE | Admitting: INTERNAL MEDICINE
Payer: COMMERCIAL

## 2020-04-25 VITALS — BODY MASS INDEX: 26.09 KG/M2 | WEIGHT: 176.12 LBS | HEIGHT: 68.98 IN

## 2020-04-25 VITALS — SYSTOLIC BLOOD PRESSURE: 97 MMHG | DIASTOLIC BLOOD PRESSURE: 60 MMHG

## 2020-04-25 VITALS — SYSTOLIC BLOOD PRESSURE: 153 MMHG | DIASTOLIC BLOOD PRESSURE: 97 MMHG
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
## 2020-04-25 DIAGNOSIS — I10: ICD-10-CM

## 2020-04-25 DIAGNOSIS — F10.239: Primary | ICD-10-CM

## 2020-04-25 DIAGNOSIS — R73.9: ICD-10-CM

## 2020-04-25 DIAGNOSIS — R62.7: ICD-10-CM

## 2020-04-25 DIAGNOSIS — I25.10: ICD-10-CM

## 2020-04-25 DIAGNOSIS — E53.8: ICD-10-CM

## 2020-04-25 DIAGNOSIS — E87.8: ICD-10-CM

## 2020-04-25 DIAGNOSIS — Z71.6: ICD-10-CM

## 2020-04-25 DIAGNOSIS — Z79.899: ICD-10-CM

## 2020-04-25 DIAGNOSIS — N17.0: ICD-10-CM

## 2020-04-25 DIAGNOSIS — D75.89: ICD-10-CM

## 2020-04-25 DIAGNOSIS — Z91.81: ICD-10-CM

## 2020-04-25 DIAGNOSIS — I48.0: ICD-10-CM

## 2020-04-25 DIAGNOSIS — E87.6: ICD-10-CM

## 2020-04-25 DIAGNOSIS — R54: ICD-10-CM

## 2020-04-25 DIAGNOSIS — F17.210: ICD-10-CM

## 2020-04-25 DIAGNOSIS — F32.9: ICD-10-CM

## 2020-04-25 DIAGNOSIS — E86.0: ICD-10-CM

## 2020-04-25 DIAGNOSIS — G47.00: ICD-10-CM

## 2020-04-25 DIAGNOSIS — E44.0: ICD-10-CM

## 2020-04-25 DIAGNOSIS — D47.3: ICD-10-CM

## 2020-04-25 DIAGNOSIS — E03.9: ICD-10-CM

## 2020-04-25 DIAGNOSIS — J44.9: ICD-10-CM

## 2020-04-25 DIAGNOSIS — E83.42: ICD-10-CM

## 2020-04-25 DIAGNOSIS — M19.90: ICD-10-CM

## 2020-04-25 DIAGNOSIS — F41.9: ICD-10-CM

## 2020-04-25 DIAGNOSIS — K44.9: ICD-10-CM

## 2020-04-25 DIAGNOSIS — Z83.49: ICD-10-CM

## 2020-04-25 DIAGNOSIS — E87.2: ICD-10-CM

## 2020-04-25 DIAGNOSIS — F10.229: ICD-10-CM

## 2020-04-25 DIAGNOSIS — E87.1: ICD-10-CM

## 2020-04-25 DIAGNOSIS — Z83.79: ICD-10-CM

## 2020-04-25 LAB
ALBUMIN SERPL-MCNC: 3.2 GM/DL (ref 3.1–4.5)
ALP SERPL-CCNC: 74 U/L (ref 45–117)
ALT SERPL W P-5'-P-CCNC: 25 U/L (ref 12–78)
APTT PPP: 36.5 SECONDS (ref 20–32.1)
AST SERPL-CCNC: 20 IU/L (ref 3–35)
BUN SERPL-MCNC: 36 MG/DL (ref 7–24)
CHLORIDE SERPL-SCNC: 95 MMOL/L (ref 98–107)
CREAT SERPL-MCNC: 2.25 MG/DL (ref 0.7–1.3)
ERYTHROCYTE [DISTWIDTH] IN BLOOD BY AUTOMATED COUNT: 13.1 % (ref 0–14.5)
ETHANOL SERPL-MCNC: 87 MG/DL (ref ?–3)
HCT VFR BLD AUTO: 42 % (ref 42–52)
INR BLD: 1.3 (ref 2–3.5)
MACROCYTES BLD QL SMEAR: SLIGHT
MCH RBC QN AUTO: 35.7 PG (ref 27–31)
MCHC RBC AUTO-ENTMCNC: 35.2 G/DL (ref 33–37)
MCV RBC AUTO: 101.4 FL (ref 80–94)
NRBC BLD QL AUTO: 0 10*3/UL (ref 0–0)
PLATELET # BLD AUTO: 223 10*3/UL (ref 130–400)
PLATELET SUFFICIENCY: NORMAL
PMV BLD AUTO: 12.8 FL (ref 9.6–12.3)
POTASSIUM SERPL-SCNC: 2.8 MMOL/L (ref 3.5–5.1)
PROT SERPL-MCNC: 7.3 GM/DL (ref 6.4–8.2)
RBC # BLD AUTO: 4.14 10*6/UL (ref 4.5–5.9)
SODIUM SERPL-SCNC: 129 MMOL/L (ref 136–145)
TOTAL CELLS COUNTED: 100 #CELLS
TROPONIN I SERPL-MCNC: < 0.015 NG/ML (ref ?–0.04)
WBC NRBC COR # BLD AUTO: 8.5 10*3/UL (ref 4.8–10.8)

## 2020-04-25 NOTE — NUR
PT RESTING ON CART, URINAL AT BEDSIDE, PT AWARE URINE NEEDED FOR COLLECTION. PT
CALL LIGHT IN REACH. DENIES UNMET NEEDS. WILL CONTINUE TO MONITOR.

## 2020-04-26 VITALS — DIASTOLIC BLOOD PRESSURE: 82 MMHG | SYSTOLIC BLOOD PRESSURE: 118 MMHG

## 2020-04-26 VITALS — DIASTOLIC BLOOD PRESSURE: 84 MMHG | SYSTOLIC BLOOD PRESSURE: 111 MMHG

## 2020-04-26 VITALS — DIASTOLIC BLOOD PRESSURE: 92 MMHG | SYSTOLIC BLOOD PRESSURE: 136 MMHG

## 2020-04-26 VITALS — DIASTOLIC BLOOD PRESSURE: 84 MMHG

## 2020-04-26 VITALS — DIASTOLIC BLOOD PRESSURE: 66 MMHG

## 2020-04-26 VITALS — DIASTOLIC BLOOD PRESSURE: 74 MMHG

## 2020-04-26 VITALS — DIASTOLIC BLOOD PRESSURE: 70 MMHG

## 2020-04-26 LAB
AMPHETAMINES UR QL SCN: < 1000
APPEARANCE UR: (no result)
BACTERIA #/AREA URNS HPF: (no result) /[HPF]
BARBITURATES UR QL SCN: < 200
BASOPHILS # BLD AUTO: 0.1 10*3/UL (ref 0–0.1)
BASOPHILS NFR BLD AUTO: 0.7 % (ref 0–1)
BENZODIAZ UR QL SCN: < 200
BILIRUB UR QL STRIP: NEGATIVE
BUN SERPL-MCNC: 32 MG/DL (ref 7–24)
BZE UR QL SCN: < 300
CANNABINOIDS UR QL SCN: < 50
CASTS URNS QL MICRO: (no result)
CHLORIDE SERPL-SCNC: 100 MMOL/L (ref 98–107)
COLOR UR: YELLOW
CREAT SERPL-MCNC: 1.74 MG/DL (ref 0.7–1.3)
EOSINOPHIL # BLD AUTO: 0.3 10*3/UL (ref 0–0.4)
EOSINOPHIL # BLD AUTO: 4.9 % (ref 1–4)
ERYTHROCYTE [DISTWIDTH] IN BLOOD BY AUTOMATED COUNT: 13 % (ref 0–14.5)
GLUCOSE UR QL: NEGATIVE
HCT VFR BLD AUTO: 39.4 % (ref 42–52)
HGB UR QL STRIP: (no result)
INR BLD: 1.2 (ref 2–3.5)
KETONES UR QL STRIP: NEGATIVE
LEUKOCYTE ESTERASE UR QL STRIP: NEGATIVE
LYMPHOCYTES # BLD AUTO: 1.4 10*3/UL (ref 1.3–4.4)
LYMPHOCYTES NFR BLD AUTO: 20.5 % (ref 27–41)
MCH RBC QN AUTO: 35.2 PG (ref 27–31)
MCHC RBC AUTO-ENTMCNC: 34.8 G/DL (ref 33–37)
MCV RBC AUTO: 101.3 FL (ref 80–94)
METHADONE UR QL SCN: < 300
MONOCYTES # BLD AUTO: 1.1 10*3/UL (ref 0.1–1)
MONOCYTES NFR BLD MANUAL: 15.2 % (ref 3–9)
NEUT #: 4 10*3/UL (ref 2.3–7.9)
NEUT %: 58 % (ref 47–73)
NITRITE UR QL STRIP: NEGATIVE
NRBC BLD QL AUTO: 0 % (ref 0–0)
OPIATES UR QL SCN: < 300
PCP UR QL SCN: <  25
PH UR STRIP: 6 [PH] (ref 5–9)
PHOSPHATE SERPL-MCNC: 4.4 MG/DL (ref 2.5–4.9)
PLATELET # BLD AUTO: 112 10*3/UL (ref 130–400)
PMV BLD AUTO: 12.7 FL (ref 9.6–12.3)
POTASSIUM SERPL-SCNC: 3.1 MMOL/L (ref 3.5–5.1)
RBC # BLD AUTO: 3.89 10*6/UL (ref 4.5–5.9)
RBC #/AREA URNS HPF: (no result) RBC/HPF (ref 0–2)
SODIUM SERPL-SCNC: 134 MMOL/L (ref 136–145)
SP GR UR: 1.01 (ref 1–1.03)
TROPONIN I SERPL-MCNC: < 0.015 NG/ML (ref ?–0.04)
UROBILINOGEN UR STRIP-MCNC: 0.2 E.U./DL (ref 0.2–1)
WBC #/AREA URNS HPF: (no result) WBC/HPF (ref 0–5)
WBC NRBC COR # BLD AUTO: 6.9 10*3/UL (ref 4.8–10.8)

## 2020-04-26 NOTE — NUR
A 54, admitted to , under the
services of DENI Buenrostro DO with a diagnosis of NEAR SYNCOPE.
Chief complaint is DIZZINESS.
Patient arrived via ambulance from ER.
Monitor applied. Initial assessment completed.
Vital signs taken and recorded.
DENI BUENROSTRO DO notified of admission to the unit.
Orders received.
See assessment for past medical history, medications
and allergies.
Patient and/or family oriented to unit.
visitation policy reviewed.
Clothing/patient valuable form completed.
 
JENNIFER SALAZAR

## 2020-04-27 VITALS — DIASTOLIC BLOOD PRESSURE: 66 MMHG

## 2020-04-27 VITALS — DIASTOLIC BLOOD PRESSURE: 87 MMHG

## 2020-04-27 VITALS — DIASTOLIC BLOOD PRESSURE: 78 MMHG

## 2020-04-27 VITALS — DIASTOLIC BLOOD PRESSURE: 86 MMHG

## 2020-04-27 VITALS — SYSTOLIC BLOOD PRESSURE: 152 MMHG | DIASTOLIC BLOOD PRESSURE: 99 MMHG

## 2020-04-27 LAB
BUN SERPL-MCNC: 30 MG/DL (ref 7–24)
CHLORIDE SERPL-SCNC: 108 MMOL/L (ref 98–107)
CREAT SERPL-MCNC: 1.24 MG/DL (ref 0.7–1.3)
POTASSIUM SERPL-SCNC: 3.8 MMOL/L (ref 3.5–5.1)
SODIUM SERPL-SCNC: 138 MMOL/L (ref 136–145)

## 2020-04-27 NOTE — NUR
in to talk to patient.
Patient states lives at home with alone.
There are no steps in the home.
Physician: melina meier
[facility]
Home health services: none
Patient's level of ADLs: MINIMAL ASSIST
Patient has working utilities: all working
DME: none
Follow-up physician's appointment after d/c: will be made by hospitalist nurse
director upon discharge
Does patient want to access PORTAL?: no
Discharge plan discussed with patient, he states he lives at home alone, he is
independent in adls and doesn't ambulate well, he states he has had several
falls over the last few weeks, no ambulatory device used, he doesn't drive but
has a , [employer], that takes him to doctors appointments and
to get groceries. discussed with him a short term skilled nursing for 5 days
of rehab prior to returning home, patient was agreeable to this if his
insurance will pay for it, he would like to be referred to [facility], discharge planner will make referral to [facility] when patient
is medically stable for discharge, case management will follow
.
 
XIMENA TATE

## 2020-04-27 NOTE — NUR
Occupational Therapy evaluation completed on four with full evaluation to
follow.  Recommend occupational therapy per plan of care and SNF upon
discharge.  Thank you for this referral.
 
Lizette Fischer OTR/L

## 2020-04-27 NOTE — NUR
Physical Therapy evaluation completed on the fourth floor with full evaluation
to follow.  Recommend physical therapy per plan of care and SNF upon
discharge.  Thank you for this referral.
 
 
Concepción Ladd PT

## 2020-04-28 VITALS — DIASTOLIC BLOOD PRESSURE: 97 MMHG | SYSTOLIC BLOOD PRESSURE: 132 MMHG

## 2020-04-28 VITALS — DIASTOLIC BLOOD PRESSURE: 88 MMHG

## 2020-04-28 VITALS — DIASTOLIC BLOOD PRESSURE: 90 MMHG

## 2020-04-28 VITALS — DIASTOLIC BLOOD PRESSURE: 90 MMHG | SYSTOLIC BLOOD PRESSURE: 127 MMHG

## 2020-04-28 VITALS — DIASTOLIC BLOOD PRESSURE: 84 MMHG | SYSTOLIC BLOOD PRESSURE: 124 MMHG

## 2020-04-28 LAB
ALBUMIN SERPL-MCNC: 2.7 GM/DL (ref 3.1–4.5)
ALP SERPL-CCNC: 69 U/L (ref 45–117)
ALT SERPL W P-5'-P-CCNC: 20 U/L (ref 12–78)
AST SERPL-CCNC: 12 IU/L (ref 3–35)
BASOPHILS # BLD AUTO: 0 10*3/UL (ref 0–0.1)
BASOPHILS NFR BLD AUTO: 0.7 % (ref 0–1)
BUN SERPL-MCNC: 24 MG/DL (ref 7–24)
CHLORIDE SERPL-SCNC: 107 MMOL/L (ref 98–107)
CREAT SERPL-MCNC: 1.07 MG/DL (ref 0.7–1.3)
EOSINOPHIL # BLD AUTO: 0.2 10*3/UL (ref 0–0.4)
EOSINOPHIL # BLD AUTO: 2.7 % (ref 1–4)
ERYTHROCYTE [DISTWIDTH] IN BLOOD BY AUTOMATED COUNT: 13.1 % (ref 0–14.5)
HCT VFR BLD AUTO: 36.1 % (ref 42–52)
LYMPHOCYTES # BLD AUTO: 0.8 10*3/UL (ref 1.3–4.4)
LYMPHOCYTES NFR BLD AUTO: 13.5 % (ref 27–41)
MCH RBC QN AUTO: 34.6 PG (ref 27–31)
MCHC RBC AUTO-ENTMCNC: 33.2 G/DL (ref 33–37)
MCV RBC AUTO: 104 FL (ref 80–94)
MONOCYTES # BLD AUTO: 1.2 10*3/UL (ref 0.1–1)
MONOCYTES NFR BLD MANUAL: 19.3 % (ref 3–9)
NEUT #: 3.8 10*3/UL (ref 2.3–7.9)
NEUT %: 63.1 % (ref 47–73)
NRBC BLD QL AUTO: 0 10*3/UL (ref 0–0)
PHOSPHATE SERPL-MCNC: 1.9 MG/DL (ref 2.5–4.9)
PLATELET # BLD AUTO: 129 10*3/UL (ref 130–400)
PMV BLD AUTO: 11.9 FL (ref 9.6–12.3)
POTASSIUM SERPL-SCNC: 3.2 MMOL/L (ref 3.5–5.1)
POTASSIUM SERPL-SCNC: 3.9 MMOL/L (ref 3.5–5.1)
PROT SERPL-MCNC: 6.6 GM/DL (ref 6.4–8.2)
RBC # BLD AUTO: 3.47 10*6/UL (ref 4.5–5.9)
SODIUM SERPL-SCNC: 143 MMOL/L (ref 136–145)
WBC NRBC COR # BLD AUTO: 6 10*3/UL (ref 4.8–10.8)

## 2020-04-28 NOTE — NUR
PT SITTING UP IN BED EATING BREAKFAST. RESP-EASY AND REGULAR. TOLERATED
ROUTINE MEDICATIONS WITH NO PROBLEM. NO C/O AT HIS TIME. BED ALARM ON. CALL
LIGHT IN REACH. SEE SHIFT ASSESSMENT.

## 2020-04-28 NOTE — NUR
case management visits with patient, he has been referred to [facility] for short term skilled nursing for rehab prior to returning
home, he will be discharged to [facility] once accepted and insurance precert
has been obtained, case management keenan

## 2020-04-28 NOTE — NUR
RESTING IN BED WITH EYES CLOSED. NO SIGNS OR SYMPTOMS OF DISTRESS NOTED.
RESPIRATIONS REGULAR AND NON-LABORED ON ROOM AIR. BED ALARM ON AND
FUNCTIONING. WILL CONTINUE TO MONITOR. CALL LIGHT IN REACH.

## 2020-04-28 NOTE — NUR
OT NOTE
Pt was seen this A.M. 1:1 for 25 minute OT session. Upon arrival pt was supine
in bed. Pt identified by name and  and had complaints of feeling "unsteady
and like I am outside my body" however was unable to further explain this
statement.  Pt transferred supine to sit EOB with SBA and use of bed rail for
UE support. Pt was educated on slow rise and visual fixation to decrease risk
of becoming dizzy. Pt then completed multiple sit to stand transfers from bed
level with [place] and use of w/w for UE support. Challenged pt's static standing
tolerance needed for increased I in self care tasks and functional transfers,
pt was able to tolerate aprox 2-3 minutes at a time before sitting due to
fatigue and stating he felt his knees were going to buckle. Throughout static
standing pt presented with R lateral lean and bouts of retrograde LOB that
both required Georgia to correct. Then challenged pt's static standing balance
which he was able to maintain F- standing balance requiring Georgia/UE support.
Functional mobility was then completed to the bathroom with Georgia and use of
w/w for UE support. There he transferred on to the standard commode with Georgia
due to poor safety awareness and off with Georgia due to low surface. Functional
mobility was then completed back to the EOB with Georgia and use of w/w with
constant verbal prompts for walker safety. Pt was left supine in bed with call
light in hand, tray table in place, and bed alarm activated for safety.
Continue with rec D/C plan to SNF.
 
AGGIE Jauregui

## 2020-04-28 NOTE — NUR
Patient resting quietly with no c/o discomfort. Respirations easy and regular.
Vital signs stable. No overt distress.
LUIS ALBERTO DOUGLAS

## 2020-04-28 NOTE — NUR
PHYSICAL THERAPY
 
Patient seen this am 1:1 for therapy visit and was resting supine in bed upon
therapist arrival. Patient identified by name /  and reports several bouts
of mild dizziness while to the bathroom earlier this morning. Patient also
states he seems to feel "out of his body", however unable to clarify this
statment. Patient transfers supine to sit EOB MIN A, taking a minute or so to
collect himself. Patient performed several sit to stand transfers, MIN A,
requiring v/c for proper hand placement, then ambulated with use of wh walker,
15'x 1 to bathroom, CGA x 1. Patient demonstrated unsteady gait pattern,
decreased stride and POOR safety awareness in tight bathroom spaces. Patient
was not happy with use of AD and needed therapist education on importance of
improved safety awareness to decrease risk of falls. Patient ambulated
addtional 30'x 1, wh walker, CGA, demonstrating very unsteady 90 / 180 turns
and returned to supine in bed with mild fatigue. Patient resting HR was 108
bpm, which increased to 122 bpm during gait ex. Patient remained in bed with
call light, tray table, telephone and bed alarm for safety. Will continue per
POC as tolerated, total treatment time 19 minutes.   Cristian Skaggs, PTA

## 2020-04-28 NOTE — NUR
PRECERT IS BEING STARTED. PATIENT HAS BEEN ACCEPTED AT [facility]. LSW
NOTIFIED  XIMENA.

## 2020-04-28 NOTE — NUR
PRECERT/ACCEPTANCE WOULD BE REQUIRED FOR SNF. LSW FAXED NEW REFERRAL TO
[facility]

## 2020-04-29 VITALS — DIASTOLIC BLOOD PRESSURE: 76 MMHG

## 2020-04-29 VITALS — DIASTOLIC BLOOD PRESSURE: 94 MMHG

## 2020-04-29 VITALS — DIASTOLIC BLOOD PRESSURE: 91 MMHG

## 2020-04-29 LAB
ALBUMIN SERPL-MCNC: 2.5 GM/DL (ref 3.1–4.5)
ALP SERPL-CCNC: 65 U/L (ref 45–117)
ALT SERPL W P-5'-P-CCNC: 19 U/L (ref 12–78)
AST SERPL-CCNC: 14 IU/L (ref 3–35)
BUN SERPL-MCNC: 22 MG/DL (ref 7–24)
CHLORIDE SERPL-SCNC: 110 MMOL/L (ref 98–107)
CREAT SERPL-MCNC: 0.94 MG/DL (ref 0.7–1.3)
PHOSPHATE SERPL-MCNC: 3.7 MG/DL (ref 2.5–4.9)
POTASSIUM SERPL-SCNC: 3.8 MMOL/L (ref 3.5–5.1)
PROT SERPL-MCNC: 6.1 GM/DL (ref 6.4–8.2)
SODIUM SERPL-SCNC: 140 MMOL/L (ref 136–145)

## 2020-04-29 NOTE — NUR
PHYSICAL THERAPY
 
Patient seen this am 1:1 for therapy visit and was resting supine in bed
upon therapist arrival. Patient identified by name /  and reports no new
c/o's at this time. OT assistant was present this morning for observation only
as patient transfers supine to sit EOB with MOD A x 1. Patient performed
several sit to stand transfers at bedside, MIN A x 1, demonstrating slow
initial rise and R side lean which required v/c to correct. Patient ambulates
with use of wh walker, CGA for straight line gait, demonstrating slow,
cautious gait pattern and decreased stride. Patient very unsteady during all
turns at MIN A and needed v/c for improved walker safety / navigation prior to
return to bedside chair for total of 40'x 1.  Patient remained in chair with
call light, tray table and body alarm as patient attendant was present for bed
linen change.  Will continue per POC as tolerated, total treatment time 17
minutes.       Cristian Skaggs, PTA

## 2020-04-29 NOTE — NUR
LSW received notice of patients discharge, LSW spoke with Chantel Barraza, they can transport the patient later this afternoon as of right now no
set time. LSW informed 4th floor of this. LSW spoke with patients next of kin.

## 2020-04-29 NOTE — NUR
PT SITTING UP IN BED WITH HOB ELEVATED. RESP-EASY AND REGULAR. NO C/O AT THIS
TIME. CALL LIGHT IN REACH. SEE SHIFT ASSESSMENT.

## 2020-04-29 NOTE — NUR
LSW NOTIFIED [facility]TH [facility] IS ON THEIR WAY TO PICK THE PATIENT UP AT
THE ER DOORS.

## 2020-04-29 NOTE — NUR
OT NOTE
Pt was seen this A.M. 1:1 for 28 minute OT session. Upon arrival pt was supine
in bed. Pt identified by name and  and had no complaints at this time. Pt
transferred supine to sit EOB with Georgia for assist with UB and use of bed
rails for UE support. While sitting EOB pt donned B socks with CGA for safety,
pt was initally bending at the waist and becoming SOB, educated pt on
compensatory technique of bringing his leg up to his knee, pt had good carry
over. Also while sitting EOB pt completed BUE towel exercises over all planes
of motion for 1 X 10 to increase and restore maximum functional use in self
care tasks. Pt then completed multiple sit to stand transfers from bed level
with Georgia and use of w/w for UE support. Challenged pt's static standing
tolerance needed for increased I in self care tasks and functional transfers,
pt was able to tolerate aprox 3-4 minutes at a time before sitting due to
fatigue. Functional mobility was then completed to the bathroom and back with
CGA and verbal prompts for walker safety. Pt was left sitting upright in the
recliner with call light in hand, tray table in place, and body alarm
activated for safety. Continue with rec D/C plan to SNF.
 
DERRICK Jauregui/L

## 2020-04-29 NOTE — NUR
PT ESCORTED VIA WHEELCHAIR TO ER FOR DISCHARGE VIA [facility]  TO
NURSING HOME. REPORT GIVEN TO EITAN AT [facility].
Discharge instructions reviewed with patient/family. Patient receptive and
verbalizes understanding. Follow-up care arranged. Written instructions given
to patient/family. HEPLOCK REMOVED 2X2 APPLIED.  PACKET GIVEN TO .
WILBUR FULLER

## 2020-04-29 NOTE — NUR
MIKEW INFORMED IRIS-NOHEMY SCHWAB MAY BE HERE AROUND 2PM TO  THE
PATIENT, HOWEVER PER NATANAEL SCHWAB, SHE CANNOT BE FOR CERTAIN A TIME.

## 2020-04-30 LAB
ALP BONE CFR SERPL: 20 % (ref 12–68)
ALP INTEST CFR SERPL: 3 % (ref 0–18)
ALP LIVER CFR SERPL: 77 % (ref 13–88)

## 2020-04-30 NOTE — NUR
PHYSICAL THERAPY CO-SIGN
 
 
I approve of the Physical Therapy notes written above.
 
 
 
Cnocepción Ladd PT

## 2020-04-30 NOTE — NUR
OCCUPATIONAL THERAPY CO-SIGN
 
I approve of the Occupational Therapy notes written above.
 
JOHAN LE, OTR/L

## 2020-10-05 ENCOUNTER — HOSPITAL ENCOUNTER (INPATIENT)
Dept: HOSPITAL 83 - ED | Age: 55
LOS: 4 days | Discharge: OTHER INSTITUTION | DRG: 426 | End: 2020-10-09
Attending: STUDENT IN AN ORGANIZED HEALTH CARE EDUCATION/TRAINING PROGRAM | Admitting: STUDENT IN AN ORGANIZED HEALTH CARE EDUCATION/TRAINING PROGRAM
Payer: COMMERCIAL

## 2020-10-05 VITALS — DIASTOLIC BLOOD PRESSURE: 76 MMHG | SYSTOLIC BLOOD PRESSURE: 117 MMHG

## 2020-10-05 VITALS — SYSTOLIC BLOOD PRESSURE: 105 MMHG | DIASTOLIC BLOOD PRESSURE: 79 MMHG

## 2020-10-05 VITALS — HEIGHT: 70 IN | WEIGHT: 156 LBS | BODY MASS INDEX: 22.33 KG/M2

## 2020-10-05 VITALS — DIASTOLIC BLOOD PRESSURE: 78 MMHG

## 2020-10-05 VITALS — SYSTOLIC BLOOD PRESSURE: 105 MMHG | DIASTOLIC BLOOD PRESSURE: 84 MMHG

## 2020-10-05 VITALS — SYSTOLIC BLOOD PRESSURE: 132 MMHG | DIASTOLIC BLOOD PRESSURE: 78 MMHG

## 2020-10-05 VITALS — DIASTOLIC BLOOD PRESSURE: 85 MMHG

## 2020-10-05 VITALS — DIASTOLIC BLOOD PRESSURE: 76 MMHG

## 2020-10-05 VITALS — DIASTOLIC BLOOD PRESSURE: 77 MMHG | SYSTOLIC BLOOD PRESSURE: 99 MMHG

## 2020-10-05 DIAGNOSIS — E03.9: ICD-10-CM

## 2020-10-05 DIAGNOSIS — Z83.79: ICD-10-CM

## 2020-10-05 DIAGNOSIS — G40.909: ICD-10-CM

## 2020-10-05 DIAGNOSIS — F10.239: ICD-10-CM

## 2020-10-05 DIAGNOSIS — Z85.818: ICD-10-CM

## 2020-10-05 DIAGNOSIS — Z79.01: ICD-10-CM

## 2020-10-05 DIAGNOSIS — K44.9: ICD-10-CM

## 2020-10-05 DIAGNOSIS — Z83.3: ICD-10-CM

## 2020-10-05 DIAGNOSIS — Z79.899: ICD-10-CM

## 2020-10-05 DIAGNOSIS — Z20.828: ICD-10-CM

## 2020-10-05 DIAGNOSIS — F41.9: ICD-10-CM

## 2020-10-05 DIAGNOSIS — Z82.49: ICD-10-CM

## 2020-10-05 DIAGNOSIS — J44.9: ICD-10-CM

## 2020-10-05 DIAGNOSIS — E83.42: ICD-10-CM

## 2020-10-05 DIAGNOSIS — I25.10: ICD-10-CM

## 2020-10-05 DIAGNOSIS — R73.9: ICD-10-CM

## 2020-10-05 DIAGNOSIS — K76.0: ICD-10-CM

## 2020-10-05 DIAGNOSIS — I48.0: ICD-10-CM

## 2020-10-05 DIAGNOSIS — R13.10: ICD-10-CM

## 2020-10-05 DIAGNOSIS — F17.210: ICD-10-CM

## 2020-10-05 DIAGNOSIS — E44.0: ICD-10-CM

## 2020-10-05 DIAGNOSIS — I10: ICD-10-CM

## 2020-10-05 DIAGNOSIS — M19.90: ICD-10-CM

## 2020-10-05 DIAGNOSIS — K52.9: ICD-10-CM

## 2020-10-05 DIAGNOSIS — Z83.49: ICD-10-CM

## 2020-10-05 DIAGNOSIS — E87.1: Primary | ICD-10-CM

## 2020-10-05 DIAGNOSIS — R00.0: ICD-10-CM

## 2020-10-05 DIAGNOSIS — Z85.89: ICD-10-CM

## 2020-10-05 DIAGNOSIS — F32.9: ICD-10-CM

## 2020-10-05 DIAGNOSIS — E86.0: ICD-10-CM

## 2020-10-05 LAB
ALBUMIN SERPL-MCNC: 2.9 GM/DL (ref 3.1–4.5)
ALP SERPL-CCNC: 111 U/L (ref 45–117)
ALT SERPL W P-5'-P-CCNC: 13 U/L (ref 12–78)
APTT PPP: 35.6 SECONDS (ref 20–32.1)
AST SERPL-CCNC: 15 IU/L (ref 3–35)
BASOPHILS # BLD AUTO: 0.1 10*3/UL (ref 0–0.1)
BASOPHILS NFR BLD AUTO: 0.9 % (ref 0–1)
BUN SERPL-MCNC: 14 MG/DL (ref 7–24)
CHLORIDE SERPL-SCNC: 102 MMOL/L (ref 98–107)
CREAT SERPL-MCNC: 0.96 MG/DL (ref 0.7–1.3)
EOSINOPHIL # BLD AUTO: 0.2 10*3/UL (ref 0–0.4)
EOSINOPHIL # BLD AUTO: 1.8 % (ref 1–4)
ERYTHROCYTE [DISTWIDTH] IN BLOOD BY AUTOMATED COUNT: 13.7 % (ref 0–14.5)
HCT VFR BLD AUTO: 43.8 % (ref 42–52)
INR BLD: 1.5 (ref 2–3.5)
LYMPHOCYTES # BLD AUTO: 1.2 10*3/UL (ref 1.3–4.4)
LYMPHOCYTES NFR BLD AUTO: 13.5 % (ref 27–41)
MCH RBC QN AUTO: 32.8 PG (ref 27–31)
MCHC RBC AUTO-ENTMCNC: 33.6 G/DL (ref 33–37)
MCV RBC AUTO: 97.8 FL (ref 80–94)
MONOCYTES # BLD AUTO: 0.9 10*3/UL (ref 0.1–1)
MONOCYTES NFR BLD MANUAL: 10.1 % (ref 3–9)
NEUT #: 6.4 10*3/UL (ref 2.3–7.9)
NEUT %: 73.1 % (ref 47–73)
NRBC BLD QL AUTO: 0 10*3/UL (ref 0–0)
PLATELET # BLD AUTO: 321 10*3/UL (ref 130–400)
PMV BLD AUTO: 10.7 FL (ref 9.6–12.3)
POTASSIUM SERPL-SCNC: 4.3 MMOL/L (ref 3.5–5.1)
PROT SERPL-MCNC: 7.8 GM/DL (ref 6.4–8.2)
RBC # BLD AUTO: 4.48 10*6/UL (ref 4.5–5.9)
SODIUM SERPL-SCNC: 129 MMOL/L (ref 136–145)
TROPONIN I SERPL-MCNC: < 0.015 NG/ML (ref ?–0.04)
WBC NRBC COR # BLD AUTO: 8.7 10*3/UL (ref 4.8–10.8)

## 2020-10-05 NOTE — NUR
PATIENT C/O RIGHT RIB PAIN. RATES 7/10. MEDICATED WITH TYLENOL AT THIS TIME.
WILL CONTINUE TO MONITOR.

## 2020-10-05 NOTE — NUR
A 55, admitted to 5E, under the
services of CRYSTAL Marti DO with a diagnosis of AFIB W/ RVR,
HYPOMAGNESEMIA.
Chief complaint is SHORTNESS OF BREATH.
Patient arrived via stretcher from ER.
Monitor applied. Initial assessment completed.
Vital signs taken and recorded.
CRYSTAL MARTI DO notified of admission to the unit.
Orders received.
See assessment for past medical history, medications
and allergies.
Patient and/or family oriented to unit.
visitation policy reviewed.
Clothing/patient valuable form completed.
 
FARRAH MONREAL

## 2020-10-05 NOTE — NUR
PATIENT WOKEN UP TO GIVE SCHEDULED DOSE OF LIBRIUM. PATIENT VOICES NO
COMPLAINTS/NEEDS AT TIME. NO TREMORS NOTED. WILL CONTINUE TO MONITOR.

## 2020-10-05 NOTE — NUR
SCABS NOTED TO THE UPPER RIGHT EXTREMITY NEAR THE WRIST. NOTHING OPEN AND
NOTHING DRAINING AT THIS TIME. PATIENT DENIES ANYOTHER WOUNDS.

## 2020-10-05 NOTE — NUR
DR. RODRIGUEZ CONTACED IN REGARDS TO PATIENT HEART RATE BEING . OK TO HOLD
CARDIZEM DRIP AT THIS TIME. ALSO INFORMED OF PATIENT BEING VIOLENT LAST VISIT
D/T HISTORY OF ALCOHOLISM. REQUESTED ATIVAN FOR WITHDRAWLS. SEE NEW ORDERS.

## 2020-10-06 VITALS — DIASTOLIC BLOOD PRESSURE: 48 MMHG | SYSTOLIC BLOOD PRESSURE: 92 MMHG

## 2020-10-06 VITALS — DIASTOLIC BLOOD PRESSURE: 60 MMHG

## 2020-10-06 VITALS — DIASTOLIC BLOOD PRESSURE: 87 MMHG | SYSTOLIC BLOOD PRESSURE: 114 MMHG

## 2020-10-06 VITALS — DIASTOLIC BLOOD PRESSURE: 67 MMHG

## 2020-10-06 VITALS — DIASTOLIC BLOOD PRESSURE: 76 MMHG

## 2020-10-06 LAB
ALBUMIN SERPL-MCNC: 2.3 GM/DL (ref 3.1–4.5)
ALP SERPL-CCNC: 80 U/L (ref 45–117)
ALT SERPL W P-5'-P-CCNC: 9 U/L (ref 12–78)
AST SERPL-CCNC: 6 IU/L (ref 3–35)
BASOPHILS # BLD AUTO: 0 10*3/UL (ref 0–0.1)
BASOPHILS NFR BLD AUTO: 0.9 % (ref 0–1)
BUN SERPL-MCNC: 15 MG/DL (ref 7–24)
CHLORIDE SERPL-SCNC: 106 MMOL/L (ref 98–107)
CHOLEST SERPL-MCNC: 92 MG/DL (ref ?–200)
CREAT SERPL-MCNC: 0.74 MG/DL (ref 0.7–1.3)
DIGOXIN SERPL-MCNC: 0.36 NG/ML (ref 0.8–2)
EOSINOPHIL # BLD AUTO: 0.2 10*3/UL (ref 0–0.4)
EOSINOPHIL # BLD AUTO: 3.8 % (ref 1–4)
ERYTHROCYTE [DISTWIDTH] IN BLOOD BY AUTOMATED COUNT: 13.9 % (ref 0–14.5)
HCT VFR BLD AUTO: 36.6 % (ref 42–52)
HDLC SERPL-MCNC: 26 MG/DL (ref 40–60)
LDLC SERPL DIRECT ASSAY-MCNC: 51 MG/DL (ref 9–159)
LYMPHOCYTES # BLD AUTO: 0.7 10*3/UL (ref 1.3–4.4)
LYMPHOCYTES NFR BLD AUTO: 17.5 % (ref 27–41)
MCH RBC QN AUTO: 32.9 PG (ref 27–31)
MCHC RBC AUTO-ENTMCNC: 33.6 G/DL (ref 33–37)
MCV RBC AUTO: 97.9 FL (ref 80–94)
MONOCYTES # BLD AUTO: 0.5 10*3/UL (ref 0.1–1)
MONOCYTES NFR BLD MANUAL: 12.5 % (ref 3–9)
NEUT #: 2.7 10*3/UL (ref 2.3–7.9)
NEUT %: 64.8 % (ref 47–73)
NRBC BLD QL AUTO: 0 % (ref 0–0)
PLATELET # BLD AUTO: 237 10*3/UL (ref 130–400)
PMV BLD AUTO: 11.3 FL (ref 9.6–12.3)
POTASSIUM SERPL-SCNC: 3.3 MMOL/L (ref 3.5–5.1)
PROT SERPL-MCNC: 6.2 GM/DL (ref 6.4–8.2)
RBC # BLD AUTO: 3.74 10*6/UL (ref 4.5–5.9)
SODIUM SERPL-SCNC: 138 MMOL/L (ref 136–145)
TRIGL SERPL-MCNC: 75 MG/DL (ref ?–150)
TSH SERPL DL<=0.005 MIU/L-ACNC: 0.02 UIU/ML (ref 0.36–4.75)
VITAMIN B12: 590 PG/ML (ref 247–911)
VLDLC SERPL CALC-MCNC: 15 MG/DL (ref 6–40)
WBC NRBC COR # BLD AUTO: 4.2 10*3/UL (ref 4.8–10.8)

## 2020-10-06 NOTE — NUR
NOTIFIED ELA DOSS, OF PT BLOOD PRESSURE 92/48.INDY SEEN PT AT
BEDSIDE. ORDERS TO D/C LIBRIUM 50 MG, PT TO CONTINUE ON LIBRIUM 25 MG.

## 2020-10-06 NOTE — NUR
PATIENT SLEEPING, NO SIGNS OF DISTRESS. RESPIRATIONS EASY, NON LABORED. BED IN
LOWEST POSITION,CALL LIGHT WITHIN REACH. WILL CONTINUE TO MONITOR.

## 2020-10-06 NOTE — NUR
Patient requesting [facility] upon discharge. Received order, faxed referral.
Notified patient will probably need 2-3 days before discharge.

## 2020-10-06 NOTE — NUR
SPEECH PATHOLOGY
Clinical swallowing evaluation completed as per orders due to dysphagia.
Medical history is significant for squamous cell CA of the oropharynx and
oropharyngeal papillomas, hx of seizure, hyponatremia, a-fib, hypoalbuminemia,
GERD, COPD, hiatal hernia, alcohol withdrawal, frequent falls. Patient is
ordered a regular diet and thin liquid. He reports feeling that food is
sticking and coughing with intake. He was alert, oriented and able to follow
commands. Oral motor exam revealed presence of natural teeth in fair condition
with several missing back teeth. Lingual/labial and buccal skills were WNL in
terms of strength, ROM and coordination. He was assesed with coarse solid and
thin liquid. He displayed no overt difficulty with either item.  As he
displayed no s/s aspiration at this time, recommend he remain on present diet
with use of safe swallow precautions such as upright positioning, small
bites/sips and alternating liquid and solid. Patient's medical status places
him at risk for aspiration therefore follow up therapy is recommended to
ensure safe tolerance of po intake. Results and cesar. were shared with patient
and his nurse who verbalized understanding. Refer to report in [employer] for
further information. Thank you for this referral.
 
FRANCK FIGUEROA MSCCC-SLP

## 2020-10-06 NOTE — NUR
in to talk to patient.
Patient states lives at HOME with ALONE.
There are 0 steps in the home.
Physician: GARFIELD ROJO
Pharmacy: [facility]
Home health services: NONE AT THIS TIME
Patient's level of ADLs: INDEPENDENT
Patient has working utilities: YES
DME: NONE
Follow-up physician's appointment after d/c: WILL BE MADE BY HOSPITALIST NURSE
DIRECTOR ON DISCHARGE
Does patient want to access PORTAL?: NO
Discharge plan PT LIVES AT HOME ALONE AND IS INDEPENDENT IN HIS CARE.  PT
STATES HE WANTS  SERVICES ON DISCHARGE.  PROVIDED LIST OF COMPANIES AND CHOSE
[facility].  WILL GET ORDER AND SEND TO [facility].  PLAN IS TO RETURN HOME WHEN MEDICALLY
STABLE.  WILL CONTINUE TO FOLLOW.  STATES HE WILL TAKE A CAB HOME..
 
KEELY GONZALES

## 2020-10-07 VITALS — DIASTOLIC BLOOD PRESSURE: 78 MMHG

## 2020-10-07 VITALS — SYSTOLIC BLOOD PRESSURE: 108 MMHG | DIASTOLIC BLOOD PRESSURE: 54 MMHG

## 2020-10-07 VITALS — SYSTOLIC BLOOD PRESSURE: 110 MMHG | DIASTOLIC BLOOD PRESSURE: 74 MMHG

## 2020-10-07 VITALS — DIASTOLIC BLOOD PRESSURE: 57 MMHG

## 2020-10-07 VITALS — DIASTOLIC BLOOD PRESSURE: 71 MMHG

## 2020-10-07 LAB
ALBUMIN SERPL-MCNC: 2.5 GM/DL (ref 3.1–4.5)
ALP SERPL-CCNC: 85 U/L (ref 45–117)
ALT SERPL W P-5'-P-CCNC: 9 U/L (ref 12–78)
AST SERPL-CCNC: 10 IU/L (ref 3–35)
BASOPHILS # BLD AUTO: 0 10*3/UL (ref 0–0.1)
BASOPHILS NFR BLD AUTO: 0.4 % (ref 0–1)
BUN SERPL-MCNC: 14 MG/DL (ref 7–24)
CHLORIDE SERPL-SCNC: 104 MMOL/L (ref 98–107)
CREAT SERPL-MCNC: 0.71 MG/DL (ref 0.7–1.3)
EOSINOPHIL # BLD AUTO: 0.1 10*3/UL (ref 0–0.4)
EOSINOPHIL # BLD AUTO: 1.5 % (ref 1–4)
ERYTHROCYTE [DISTWIDTH] IN BLOOD BY AUTOMATED COUNT: 13.8 % (ref 0–14.5)
HCT VFR BLD AUTO: 38.1 % (ref 42–52)
LYMPHOCYTES # BLD AUTO: 0.6 10*3/UL (ref 1.3–4.4)
LYMPHOCYTES NFR BLD AUTO: 8.3 % (ref 27–41)
MCH RBC QN AUTO: 33 PG (ref 27–31)
MCHC RBC AUTO-ENTMCNC: 33.3 G/DL (ref 33–37)
MCV RBC AUTO: 99 FL (ref 80–94)
MONOCYTES # BLD AUTO: 0.6 10*3/UL (ref 0.1–1)
MONOCYTES NFR BLD MANUAL: 8.3 % (ref 3–9)
NEUT #: 5.8 10*3/UL (ref 2.3–7.9)
NEUT %: 81.2 % (ref 47–73)
NRBC BLD QL AUTO: 0 % (ref 0–0)
PLATELET # BLD AUTO: 257 10*3/UL (ref 130–400)
PMV BLD AUTO: 11.4 FL (ref 9.6–12.3)
POTASSIUM SERPL-SCNC: 4.1 MMOL/L (ref 3.5–5.1)
PROT SERPL-MCNC: 6.6 GM/DL (ref 6.4–8.2)
RBC # BLD AUTO: 3.85 10*6/UL (ref 4.5–5.9)
SODIUM SERPL-SCNC: 135 MMOL/L (ref 136–145)
WBC NRBC COR # BLD AUTO: 7.2 10*3/UL (ref 4.8–10.8)

## 2020-10-07 NOTE — NUR
IN TO TALK WITH PT ABOUT GOING TO A FACILITY FOR REHAB PRIOR TO
GOING HOME.  PT STATES HE WAS AT [facility] PREVIOUSLY AND WILL NOT GO THERE.
TOLD PT THERE WER OTHER OPTIONS.  EXPLAINED FACILITIES TO PT AND HE WANTS TO
GO TO REHAB SUITES IF THEY WILL ACCEPT HIS INSURANCE.  REFERRAL FAXED TO [facility]
FOR THEM TO LOOK AT.  IF ACCEPTED HE WILL NEED COVID TESTING, AND PRECERT.  PT
AND OT HAS BEEN ORDERED.

## 2020-10-07 NOTE — NUR
SPEECH PATHOLOGY
Patient was seen for treatment this am, conducted during breakfast meal.
Patient was alert and with generalized weakness. He was able to feed himself,
moving and eating very slowly. Patient consumed a variety of solid foods with
no overt difficulty. He took thin liquids by straw, and was observed coughing
post swallow with each sip. It was recommended that he remove straw and drink
by cup. When doing this, no cough was observed. Recommend he continue with
liquids by cup only. Patient noted the improvement when drinking in this
manner and stated that he would avoid straws. Risk of aspiration was explained
and he verbalized understanding. Continue therapy plan.
 
FRANCK FIGUEROA MSCCC-SLP

## 2020-10-07 NOTE — NUR
Occupational Therapy evaluation completed on 5 with full eval to follow.
Precautions include fall risk,IV UE, debility, moderate complexity level
60846. Recommend OT per pOC and SNF to enable return home at max level of
independence and safety. Patient was struggling at home alone prior to
admission. He admits that he was eating 2x/wk because it was too difficult to
cook.  If he is unable to go to SNF then recommend home health SN,OT,PT, HHA
and mobile meals. Thank you for this referral.
Lesia Del Rio OTR/L

## 2020-10-07 NOTE — NUR
PATIENT IS OUT OF NETWORK WITH REHAB SUITES, THERE ARE NO OUT OF NETWORK
BENEFITS. RN CASE MANAGER IS AWARE.

## 2020-10-07 NOTE — NUR
PHYSICAL THERAPY
 
Physical Therapy evaluation completed on 5th floor with full evaluation to
follow. Recommend physical therapy per plan of care and SNF upon discharge.
Thank you for this referral.
 
Stone Meyer SPT
Concepción Ladd PT

## 2020-10-08 VITALS — DIASTOLIC BLOOD PRESSURE: 68 MMHG | SYSTOLIC BLOOD PRESSURE: 96 MMHG

## 2020-10-08 VITALS — SYSTOLIC BLOOD PRESSURE: 111 MMHG | DIASTOLIC BLOOD PRESSURE: 81 MMHG

## 2020-10-08 VITALS — DIASTOLIC BLOOD PRESSURE: 97 MMHG | SYSTOLIC BLOOD PRESSURE: 130 MMHG

## 2020-10-08 VITALS — DIASTOLIC BLOOD PRESSURE: 56 MMHG

## 2020-10-08 VITALS — DIASTOLIC BLOOD PRESSURE: 69 MMHG

## 2020-10-08 VITALS — DIASTOLIC BLOOD PRESSURE: 72 MMHG

## 2020-10-08 LAB
BUN SERPL-MCNC: 14 MG/DL (ref 7–24)
CHLORIDE SERPL-SCNC: 106 MMOL/L (ref 98–107)
CREAT SERPL-MCNC: 0.68 MG/DL (ref 0.7–1.3)
POTASSIUM SERPL-SCNC: 3.4 MMOL/L (ref 3.5–5.1)
SODIUM SERPL-SCNC: 134 MMOL/L (ref 136–145)

## 2020-10-08 NOTE — NUR
SPEECH PATHOLOGY
Treatment was attempted this am. Patient's room was dark and he was sound
asleep. Will attempt again later.
FRANCK FIGUEROA MSCCC-SLP

## 2020-10-08 NOTE — NUR
OT NOTE
Pt was seen this A.M. 1:1 for 15 minute OT session. Upon arrival pt was supine
in bed. Pt identified by name and  and had complaints of 8-9/10 L sided rib
pain. Pt transferred supine to sit EOB with SBA. While sitting EOB pt donned B
socks with SBA. Sit to stand completed from bed level with CGA and use of w/w
for UE support. Functional mobility was then completed to the bathroom with
CGA and use of w/w with constant verbal prompts to slow down due to being
impulsive and increasing risk of falls, poor carry over throughout. Pt
transferred on to the standard commode with CGA and off with Georgia due to low
surface. Functional mobility completed back to the EOB for a seated rest
break. SpO2 read 95% and heart rate 87 bpm. Attempted to complete other tasks
at this time and pt reported "no, I am done" and transferred sit to supine
with SBA. There he was left with call light in hand, tray table in place, and
bed alarm activated for safety. Continue with rec D/C plan to SNF.
 
DERRICK Jauregui/L

## 2020-10-08 NOTE — NUR
PHYSICAL THERAPY
 
TREATMENT TIME:        IN  08:40 AM     20 MINUTES
 
PRESENTATION:
Patient presented to therapy in supine with head of bed elevated
Identified by name and  on wristband
Informed consent given by patient
No IVs
No spO2
No catheter
Bed alarm on
 
COMPLAINTS:
L rib cage pain of 8/10
Dizziness at times
 
WB STATUS:
No wt bearing resrtictions
 
ASSISTIVE DEVICE:
Wh Walker
 
TRANSFERS:
Supine <> sit on EOB: SBA
Sit on EOB: SBA
STS from EOB: MIN A X 1
Verbal cues for hand placement
 
TREATMENT:
Patient ambulated with Wh Walker and CGA for 20' x 2
Verbal cues for upright posture and locking knees into extension
 
RESPONSE TO TREATMENT:
Patient had increased dizziness upon sitting up to EOB
No LOB with gait of transfers
No increased L rib pain with Gait or transfers
Patient became very fatigued and requested therapy stop after gait
 
CONCLUSION;
Patient was left in supine in bed  with head of bed elevated
Call light within reach
Bed alarm on
Head of bed elevated
 
                                     SUNNY IBRAHIM PTA

## 2020-10-08 NOTE — NUR
SPEECH PATHOLOGY
Treatment was attempted again this pm. Patient was laying on his side,
sleeping peacefully. His lunch tray was present but had not been touched. He
did not respond when his name was called. Will attempt treatment again at a
later time.
 
FRANCK FIGUEROA MSCCC-SLP

## 2020-10-08 NOTE — NUR
awakend patient. lung sounds harsh moist rhonchi in posterior patient says he
is coughing up phlemb and said "this is how he felt before when he had
pneumonia". After assessment patient said " good night until morning"
explained to patient I had night medications to give him and that I will be
returning to check on him.

## 2020-10-08 NOTE — NUR
PT RESTING IN BED/   NO DISTRESS NOTED.  PT STATES THAT HES VERY TIRED AND
JUST WANTS TO SLEEP
WILL MONITOR

## 2020-10-09 VITALS — DIASTOLIC BLOOD PRESSURE: 88 MMHG | SYSTOLIC BLOOD PRESSURE: 107 MMHG

## 2020-10-09 VITALS — DIASTOLIC BLOOD PRESSURE: 90 MMHG

## 2020-10-09 VITALS — DIASTOLIC BLOOD PRESSURE: 78 MMHG

## 2020-10-09 NOTE — NUR
Discharge instructions reviewed with patient/family. Patient receptive and
verbalizes understanding. Follow-up care arranged. Written instructions given
to patient/family.
ABEL DOTSON

## 2020-10-09 NOTE — NUR
SPEECH THERAPY
 
Patient seen for dysphagia treatment this AM. Patient reported he had not
eaten much breakfast and was agreeable to consume small snack. Upon entry to
patient's room, patient had cup of liquid at bedside with straw placed within
the cup. Per previous speech therapy recommendations, patient is to avoid
straws due to increased risk of penetration/aspiration with observed coughing
following liqudis by straw. When discussing use of straw with patient, he
recalled that he was to avoid use of straws, however stated that he had
been using a straw to drink.  Reiterated recommendation to avoid use of straw
to increase safety of swallow. Patient consumed coarse solid with sips of thin
liquid by cup. He independently took small bites and sips and displayed no
difficulty throughout session. Recommend continued short term treatment as
patient required cues to implent strategies, including alternating between
solids and liquids and avoidance of straw.  Patient re-educated on his
recommended strategies with patient verbalizing agreement and understanding.
As clinicain was exiting room, patient was observed removing straw from cup.
Continue plan of care to ensuare safety and tolerance of recommended diet
througout a meal with improved independnet use of safe and compensatory
swallowing strategies.
 
Corazon Schmitz MA CCC-SLP

## 2020-10-09 NOTE — NUR
OT NOTE
Pt was seen this A.M. 1:1 for 15 minute OT session. Upon arrival pt was supine
in bed. Pt identified by name and  and had complaints of 8/10 L sided rib
pain. Pt transferred sit to supine with SBA. While seated EOB pt donned B
socks with supervision. Sit to stand completed from bed level with SBA
followed by functional mobility to the bathroom with SBA. There she
transferred on/off standard commode with SBA. Functional mobility completed
back to the EOB with SBA. There he transferred sit to supine with SBA and was
left with call light in hand, tray table in place, and bed alarm activated for
safety. Continue with rec D/C plan to SNF.
 
DERRICK Jauregui/L

## 2020-10-09 NOTE — NUR
NOTIFIED DR HALL THAT WE WERE UNABLE TO REGAIN IV ACCESS.MULTIPLE
ATTEMPTS MADE TO REGAIN IV ACCESS BY MULTIPLE RN'S. SURGERY UNAVAILABLE TODAY
FOR MIDLINE PLACEMENT. DR HALL AWARE AND STATED "HE WOULD FIGURE IT OUT".

## 2020-10-09 NOTE — NUR
PRECERT HAS BEEN OBTAINED. PATIENT CAN BE DISCHARGED TO [facility] IF MEDICALLY
STABLE. LSW NOTIFIED RN HOSPITALIST COORDINATOR ARANZA.

## 2020-10-09 NOTE — NUR
LSW NOTIFIED OF PATIENT DISCHARGE. LSW SPOKE WITH IRIS GARCIA WHO WAS SPEAKING
WITH RN. W ARRANGED FOR [facility] TO TRANSPORT THIS PATIENT AT 2:30PM.
LSW NOTIFIED , [facility], AND PATIENTS MOTHER ALDEN OF DISCHARGE. LSW
FAXED DEMOGRAPHICS TO [facility] AND DISCHARGE ORDERS TO [facility].

## 2020-10-09 NOTE — NUR
PHYSICAL THERAPY
 
Patient seen this am 1;1 for therapy visit and was supine in bed upon
therapist arrival. Patient identified by name /  and joined by OT assistant
for observation this session. Patient reports 8/10 L rib pain and transfers
supine to sit EOB, SBA x 1, demonstrating very slow movements. Patient
completed sit to stand transfer CGA, then ambulated without AD, 20' x 2 to
bathroom, SBA x 1, demonstrating slow, antalgic gait pattern. Patient also
tolerated eyes open / closed balance ex x 10 seconds without LOB and returned
to EOB sit. Patient remained in bed with call light, tray table and telephone
Will continue per POC as tolerated, total treatment time 16 minutes.
Cristian Skaggs, PTA

## 2020-10-12 NOTE — NUR
OCCUPATIONAL THERAPY CO-SIGN
 
I approve of the Occupational Therapy notes written above.
JESSIE MATOS OTR/LYNDSEY

## 2020-12-13 ENCOUNTER — HOSPITAL ENCOUNTER (INPATIENT)
Dept: HOSPITAL 83 - ED | Age: 55
LOS: 3 days | Discharge: HOSPICE | DRG: 720 | End: 2020-12-16
Attending: STUDENT IN AN ORGANIZED HEALTH CARE EDUCATION/TRAINING PROGRAM | Admitting: STUDENT IN AN ORGANIZED HEALTH CARE EDUCATION/TRAINING PROGRAM
Payer: COMMERCIAL

## 2020-12-13 VITALS — DIASTOLIC BLOOD PRESSURE: 78 MMHG | SYSTOLIC BLOOD PRESSURE: 128 MMHG

## 2020-12-13 VITALS — SYSTOLIC BLOOD PRESSURE: 98 MMHG | DIASTOLIC BLOOD PRESSURE: 73 MMHG

## 2020-12-13 VITALS — SYSTOLIC BLOOD PRESSURE: 125 MMHG | DIASTOLIC BLOOD PRESSURE: 78 MMHG

## 2020-12-13 VITALS — DIASTOLIC BLOOD PRESSURE: 90 MMHG

## 2020-12-13 VITALS — DIASTOLIC BLOOD PRESSURE: 81 MMHG

## 2020-12-13 VITALS — DIASTOLIC BLOOD PRESSURE: 48 MMHG | SYSTOLIC BLOOD PRESSURE: 123 MMHG

## 2020-12-13 VITALS — HEIGHT: 67.99 IN | BODY MASS INDEX: 20.96 KG/M2 | WEIGHT: 138.3 LBS

## 2020-12-13 VITALS — DIASTOLIC BLOOD PRESSURE: 84 MMHG

## 2020-12-13 DIAGNOSIS — M19.90: ICD-10-CM

## 2020-12-13 DIAGNOSIS — Z86.73: ICD-10-CM

## 2020-12-13 DIAGNOSIS — E87.6: ICD-10-CM

## 2020-12-13 DIAGNOSIS — J18.9: ICD-10-CM

## 2020-12-13 DIAGNOSIS — E78.5: ICD-10-CM

## 2020-12-13 DIAGNOSIS — E03.9: ICD-10-CM

## 2020-12-13 DIAGNOSIS — A41.9: Primary | ICD-10-CM

## 2020-12-13 DIAGNOSIS — E83.52: ICD-10-CM

## 2020-12-13 DIAGNOSIS — U07.1: ICD-10-CM

## 2020-12-13 DIAGNOSIS — E87.0: ICD-10-CM

## 2020-12-13 DIAGNOSIS — F17.210: ICD-10-CM

## 2020-12-13 DIAGNOSIS — I10: ICD-10-CM

## 2020-12-13 DIAGNOSIS — I48.21: ICD-10-CM

## 2020-12-13 DIAGNOSIS — E83.42: ICD-10-CM

## 2020-12-13 DIAGNOSIS — I25.10: ICD-10-CM

## 2020-12-13 DIAGNOSIS — R65.20: ICD-10-CM

## 2020-12-13 DIAGNOSIS — F41.9: ICD-10-CM

## 2020-12-13 DIAGNOSIS — Z83.79: ICD-10-CM

## 2020-12-13 DIAGNOSIS — F32.9: ICD-10-CM

## 2020-12-13 DIAGNOSIS — D53.9: ICD-10-CM

## 2020-12-13 DIAGNOSIS — Z91.81: ICD-10-CM

## 2020-12-13 DIAGNOSIS — E43: ICD-10-CM

## 2020-12-13 DIAGNOSIS — Z83.3: ICD-10-CM

## 2020-12-13 DIAGNOSIS — E86.0: ICD-10-CM

## 2020-12-13 DIAGNOSIS — E83.39: ICD-10-CM

## 2020-12-13 DIAGNOSIS — Z83.49: ICD-10-CM

## 2020-12-13 DIAGNOSIS — R29.6: ICD-10-CM

## 2020-12-13 DIAGNOSIS — F10.231: ICD-10-CM

## 2020-12-13 DIAGNOSIS — Z79.899: ICD-10-CM

## 2020-12-13 DIAGNOSIS — M62.84: ICD-10-CM

## 2020-12-13 DIAGNOSIS — J44.0: ICD-10-CM

## 2020-12-13 DIAGNOSIS — Z82.49: ICD-10-CM

## 2020-12-13 DIAGNOSIS — M81.0: ICD-10-CM

## 2020-12-13 DIAGNOSIS — J96.01: ICD-10-CM

## 2020-12-13 DIAGNOSIS — G40.909: ICD-10-CM

## 2020-12-13 DIAGNOSIS — R79.89: ICD-10-CM

## 2020-12-13 DIAGNOSIS — Z79.01: ICD-10-CM

## 2020-12-13 DIAGNOSIS — J94.8: ICD-10-CM

## 2020-12-13 DIAGNOSIS — I47.1: ICD-10-CM

## 2020-12-13 LAB
ALBUMIN SERPL-MCNC: 2.2 GM/DL (ref 3.1–4.5)
ALP SERPL-CCNC: 101 U/L (ref 45–117)
ALT SERPL W P-5'-P-CCNC: 33 U/L (ref 12–78)
APTT PPP: 27 SECONDS (ref 20–32.1)
AST SERPL-CCNC: 69 IU/L (ref 3–35)
BASE EXCESS BLDA CALC-SCNC: 1.6 MMOL/L (ref -2–2)
BASE EXCESS BLDA CALC-SCNC: 2.2 MMOL/L (ref -2–2)
BILIRUB UR QL STRIP: (no result)
BUN SERPL-MCNC: 13 MG/DL (ref 7–24)
CHLORIDE SERPL-SCNC: 102 MMOL/L (ref 98–107)
CK SERPL-CCNC: 95 U/L (ref 39–308)
CREAT SERPL-MCNC: 0.72 MG/DL (ref 0.7–1.3)
EPI CELLS #/AREA URNS HPF: (no result) /[HPF]
ERYTHROCYTE [DISTWIDTH] IN BLOOD BY AUTOMATED COUNT: 18.2 % (ref 0–14.5)
HCT VFR BLD AUTO: 45.4 % (ref 42–52)
HGB UR QL STRIP: (no result)
INR BLD: 1.2 (ref 2–3.5)
MACROCYTES BLD QL SMEAR: SLIGHT
MCH RBC QN AUTO: 34.5 PG (ref 27–31)
MCHC RBC AUTO-ENTMCNC: 32.6 G/DL (ref 33–37)
MCV RBC AUTO: 105.8 FL (ref 80–94)
NRBC BLD QL AUTO: 0 10*3/UL (ref 0–0)
PCO2 BLDA: 30 MMHG (ref 35–45)
PCO2 BLDA: 31 MMHG (ref 35–45)
PH BLDA: 7.5 [PH] (ref 7.35–7.45)
PH BLDA: 7.51 [PH] (ref 7.35–7.45)
PH UR STRIP: 5.5 [PH] (ref 4.5–8)
PLATELET # BLD AUTO: 169 10*3/UL (ref 130–400)
PLATELET SUFFICIENCY: NORMAL
PMV BLD AUTO: 12.7 FL (ref 9.6–12.3)
PO2 BLDA: 60 MMHG (ref 80–90)
PO2 BLDA: 62 MMHG (ref 80–90)
POLYCHROMASIA BLD QL SMEAR: SLIGHT
POTASSIUM SERPL-SCNC: 2.5 MMOL/L (ref 3.5–5.1)
PROT SERPL-MCNC: 7.3 GM/DL (ref 6.4–8.2)
RBC # BLD AUTO: 4.29 10*6/UL (ref 4.5–5.9)
SAO2 % BLDA: 91 % (ref 95–97)
SAO2 % BLDA: 91 % (ref 95–97)
SODIUM SERPL-SCNC: 140 MMOL/L (ref 136–145)
SP GR UR: 1.02 (ref 1–1.03)
TOTAL CELLS COUNTED: 100 #CELLS
TROPONIN I SERPL-MCNC: < 0.015 NG/ML (ref ?–0.04)
UROBILINOGEN UR STRIP-MCNC: 1 E.U./DL (ref 0–1)
WBC #/AREA URNS HPF: (no result) WBC/HPF (ref 0–5)
WBC NRBC COR # BLD AUTO: 7 10*3/UL (ref 4.8–10.8)

## 2020-12-13 NOTE — NUR
PT BODY WASHED WITH CLEAN SHEETS AND GOWN, HAIR WASHED. NO BEDBUGS SEEN AT THIS
TIME. PT TOLERATED. DRIED STOOL NOTED TO BUTTOCK AND LEGS.

## 2020-12-14 VITALS — DIASTOLIC BLOOD PRESSURE: 62 MMHG | SYSTOLIC BLOOD PRESSURE: 110 MMHG

## 2020-12-14 VITALS — DIASTOLIC BLOOD PRESSURE: 72 MMHG

## 2020-12-14 VITALS — DIASTOLIC BLOOD PRESSURE: 69 MMHG

## 2020-12-14 VITALS — SYSTOLIC BLOOD PRESSURE: 94 MMHG | DIASTOLIC BLOOD PRESSURE: 69 MMHG

## 2020-12-14 VITALS — DIASTOLIC BLOOD PRESSURE: 71 MMHG | SYSTOLIC BLOOD PRESSURE: 80 MMHG

## 2020-12-14 VITALS — DIASTOLIC BLOOD PRESSURE: 66 MMHG | SYSTOLIC BLOOD PRESSURE: 117 MMHG

## 2020-12-14 VITALS — DIASTOLIC BLOOD PRESSURE: 86 MMHG

## 2020-12-14 VITALS — DIASTOLIC BLOOD PRESSURE: 67 MMHG

## 2020-12-14 VITALS — DIASTOLIC BLOOD PRESSURE: 89 MMHG | SYSTOLIC BLOOD PRESSURE: 106 MMHG

## 2020-12-14 VITALS — DIASTOLIC BLOOD PRESSURE: 82 MMHG

## 2020-12-14 VITALS — DIASTOLIC BLOOD PRESSURE: 80 MMHG

## 2020-12-14 VITALS — SYSTOLIC BLOOD PRESSURE: 108 MMHG | DIASTOLIC BLOOD PRESSURE: 75 MMHG

## 2020-12-14 VITALS — SYSTOLIC BLOOD PRESSURE: 99 MMHG | DIASTOLIC BLOOD PRESSURE: 75 MMHG

## 2020-12-14 VITALS — DIASTOLIC BLOOD PRESSURE: 65 MMHG

## 2020-12-14 VITALS — DIASTOLIC BLOOD PRESSURE: 77 MMHG

## 2020-12-14 VITALS — DIASTOLIC BLOOD PRESSURE: 75 MMHG | SYSTOLIC BLOOD PRESSURE: 108 MMHG

## 2020-12-14 VITALS — DIASTOLIC BLOOD PRESSURE: 50 MMHG | SYSTOLIC BLOOD PRESSURE: 120 MMHG

## 2020-12-14 VITALS — SYSTOLIC BLOOD PRESSURE: 98 MMHG | DIASTOLIC BLOOD PRESSURE: 80 MMHG

## 2020-12-14 VITALS — SYSTOLIC BLOOD PRESSURE: 108 MMHG | DIASTOLIC BLOOD PRESSURE: 70 MMHG

## 2020-12-14 VITALS — DIASTOLIC BLOOD PRESSURE: 48 MMHG

## 2020-12-14 VITALS — DIASTOLIC BLOOD PRESSURE: 76 MMHG

## 2020-12-14 VITALS — DIASTOLIC BLOOD PRESSURE: 50 MMHG

## 2020-12-14 LAB
ALBUMIN SERPL-MCNC: 1.6 GM/DL (ref 3.1–4.5)
ALP SERPL-CCNC: 71 U/L (ref 45–117)
ALT SERPL W P-5'-P-CCNC: 24 U/L (ref 12–78)
AST SERPL-CCNC: 48 IU/L (ref 3–35)
BUN SERPL-MCNC: 13 MG/DL (ref 7–24)
CHLORIDE SERPL-SCNC: 112 MMOL/L (ref 98–107)
CREAT SERPL-MCNC: 0.57 MG/DL (ref 0.7–1.3)
ERYTHROCYTE [DISTWIDTH] IN BLOOD BY AUTOMATED COUNT: 18.4 % (ref 0–14.5)
HCT VFR BLD AUTO: 36.3 % (ref 42–52)
LDH SERPL-CCNC: 211 U/L (ref 87–241)
MACROCYTES BLD QL SMEAR: SLIGHT
MCH RBC QN AUTO: 34.2 PG (ref 27–31)
MCHC RBC AUTO-ENTMCNC: 32.2 G/DL (ref 33–37)
MCV RBC AUTO: 106.1 FL (ref 80–94)
NRBC BLD QL AUTO: 0 10*3/UL (ref 0–0)
PLATELET # BLD AUTO: 172 10*3/UL (ref 130–400)
PLATELET SUFFICIENCY: NORMAL
PMV BLD AUTO: 12.9 FL (ref 9.6–12.3)
POLYCHROMASIA BLD QL SMEAR: SLIGHT
POTASSIUM SERPL-SCNC: 2.9 MMOL/L (ref 3.5–5.1)
PROT SERPL-MCNC: 5.6 GM/DL (ref 6.4–8.2)
RBC # BLD AUTO: 3.42 10*6/UL (ref 4.5–5.9)
ROULEAUX BLD QL SMEAR: SLIGHT
SODIUM SERPL-SCNC: 146 MMOL/L (ref 136–145)
TOTAL CELLS COUNTED: 100 #CELLS
WBC NRBC COR # BLD AUTO: 9 10*3/UL (ref 4.8–10.8)

## 2020-12-14 NOTE — NUR
AS CHARGE NURSE I DID QUESTION THAT PT'S PULSE REMAINS 160-190 SINCE ARRIVAL
YESTERDAY.  FIRST CARDIZEM BOTTLE IS NOW FOUND TO HAVE NOT BEEN ACTIVATED ON IV
LINE AND THE SALINE DILUTE JUST NOW COMPLETES ITS 5CC/HR INFUSION.  I DID CALL
DR FRENCH'S LINE AND SPOKE WITH THE RESIDENT WHO ANSWERED AND INFORMED THEM
OF THIS ISSUE AND HE STATES HE WILL NOW INPUT NEW [id number].

## 2020-12-14 NOTE — NUR
DR. PERDOMO CONTACTED. DID NOT LET ME GIVE HIM A REPORT STATED THAT HE WOULD BE
IN TO SEE THE PATIENT.

## 2020-12-14 NOTE — NUR
PT WAS GIVEN LOVENOX, POTASSIUM AND MAG. PT HAS BREAKFAST TRAY IN ROOM BUT
STATES HE IS NOT HUNGRY RIGHT NOW. TRAY IS BESIDE BED. CALL LIGHT WITHIN REACH.
PT DENIES ANY NEEDS AT THIS TIME. STATES HE IS JUST TIRED.

## 2020-12-14 NOTE — NUR
LINENS HAVE BEEN CHANGED. PT GIVEN SIP OF WATER. PT TOLERATED WELL. PT DID NOT
WISH TO EAT LUNCH. RESTING IN BED. CALL LIGHT WITHIN REACH. WILL CONTINUE TO
MONITOR.

## 2020-12-14 NOTE — NUR
PT RESTING IN BED. HR IN THE 140S. CARDIZEM IS AT 5MG AND NOW BBUMPED TO 1OMG.
PT IS RESTING IN BED. ATIVAN GIVEN.

## 2020-12-15 VITALS — DIASTOLIC BLOOD PRESSURE: 60 MMHG | SYSTOLIC BLOOD PRESSURE: 118 MMHG

## 2020-12-15 VITALS — DIASTOLIC BLOOD PRESSURE: 58 MMHG

## 2020-12-15 VITALS — DIASTOLIC BLOOD PRESSURE: 58 MMHG | SYSTOLIC BLOOD PRESSURE: 108 MMHG

## 2020-12-15 VITALS — DIASTOLIC BLOOD PRESSURE: 74 MMHG | SYSTOLIC BLOOD PRESSURE: 98 MMHG

## 2020-12-15 VITALS — DIASTOLIC BLOOD PRESSURE: 50 MMHG

## 2020-12-15 VITALS — DIASTOLIC BLOOD PRESSURE: 56 MMHG

## 2020-12-15 VITALS — DIASTOLIC BLOOD PRESSURE: 64 MMHG

## 2020-12-15 VITALS — DIASTOLIC BLOOD PRESSURE: 62 MMHG

## 2020-12-15 VITALS — DIASTOLIC BLOOD PRESSURE: 82 MMHG

## 2020-12-15 VITALS — SYSTOLIC BLOOD PRESSURE: 116 MMHG | DIASTOLIC BLOOD PRESSURE: 63 MMHG

## 2020-12-15 VITALS — SYSTOLIC BLOOD PRESSURE: 84 MMHG

## 2020-12-15 VITALS — DIASTOLIC BLOOD PRESSURE: 79 MMHG

## 2020-12-15 VITALS — SYSTOLIC BLOOD PRESSURE: 113 MMHG | DIASTOLIC BLOOD PRESSURE: 72 MMHG

## 2020-12-15 VITALS — DIASTOLIC BLOOD PRESSURE: 75 MMHG | SYSTOLIC BLOOD PRESSURE: 95 MMHG

## 2020-12-15 VITALS — DIASTOLIC BLOOD PRESSURE: 73 MMHG

## 2020-12-15 VITALS — SYSTOLIC BLOOD PRESSURE: 96 MMHG | DIASTOLIC BLOOD PRESSURE: 76 MMHG

## 2020-12-15 LAB
ALBUMIN SERPL-MCNC: 1.6 GM/DL (ref 3.1–4.5)
ALP SERPL-CCNC: 66 U/L (ref 45–117)
ALT SERPL W P-5'-P-CCNC: 25 U/L (ref 12–78)
AST SERPL-CCNC: 46 IU/L (ref 3–35)
BUN SERPL-MCNC: 13 MG/DL (ref 7–24)
BURR CELLS BLD QL SMEAR: (no result)
CHLORIDE SERPL-SCNC: 113 MMOL/L (ref 98–107)
CK SERPL-CCNC: 43 U/L (ref 39–308)
CREAT SERPL-MCNC: 0.38 MG/DL (ref 0.7–1.3)
ERYTHROCYTE [DISTWIDTH] IN BLOOD BY AUTOMATED COUNT: 18.5 % (ref 0–14.5)
HCT VFR BLD AUTO: 38.4 % (ref 42–52)
LDH SERPL-CCNC: 285 U/L (ref 87–241)
MACROCYTES BLD QL SMEAR: SLIGHT
MCH RBC QN AUTO: 34.6 PG (ref 27–31)
MCHC RBC AUTO-ENTMCNC: 32.6 G/DL (ref 33–37)
MCV RBC AUTO: 106.4 FL (ref 80–94)
NRBC BLD QL AUTO: 0 10*3/UL (ref 0–0)
PLATELET # BLD AUTO: 185 10*3/UL (ref 130–400)
PLATELET SUFFICIENCY: NORMAL
PMV BLD AUTO: 12.9 FL (ref 9.6–12.3)
POTASSIUM SERPL-SCNC: 3.4 MMOL/L (ref 3.5–5.1)
PROT SERPL-MCNC: 5.6 GM/DL (ref 6.4–8.2)
RBC # BLD AUTO: 3.61 10*6/UL (ref 4.5–5.9)
ROULEAUX BLD QL SMEAR: SLIGHT
SODIUM SERPL-SCNC: 145 MMOL/L (ref 136–145)
T4 FREE SERPL-MCNC: 1.14 NG/DL (ref 0.76–1.46)
TOTAL CELLS COUNTED: 100 #CELLS
TSH SERPL DL<=0.005 MIU/L-ACNC: 12.2 UIU/ML (ref 0.36–4.75)
WBC NRBC COR # BLD AUTO: 10 10*3/UL (ref 4.8–10.8)

## 2020-12-15 NOTE — NUR
PT REMAINS TACHYCARDIC.  CARDIZEM DRIP REMAINS STOPPED.  B/P IS 95/75. 
HOSPITALIST MADE AWARE OF PATIENTS CONDITION.  94% % NON-REBREATHER. 
WILL NOTIFY CARDIOLOGY.

## 2020-12-15 NOTE — NUR
SPOKE WITH DR. PERDOMO ON THE PHONE.  DR. PERDOMO AWARE OF PT CONDITION. IV
DIGOXIN TO BE ADMINISTERED PER ORDER.  SEE ORDERS AND EMAR.

## 2020-12-15 NOTE — NUR
DR FRENCH NOTIFIED OF CONTINUED HYPOTENSION AND UNDATED ON PATIENT CONDITION.
ORDERS REC'D.
UDAY MURPHY RN

## 2020-12-15 NOTE — NUR
SPOKE WITH DR VALDEZ BY [facility] TOLD TO REDUCE CARIZEM BY 5MCG PT RATE AT 70
APPEARS TO BE SINUS B/P /59

## 2020-12-15 NOTE — NUR
PT IN BED IRRATABLE REFUSED LAB DRAW PT ALSO REFUSED TO KEEP PULSE OX MONITOR
ON PT DOES NOT ANSWER QUESTIONS PT "MUTTERS AND SWEARS" MAKING A FIST TO ANY
STIMULI

## 2020-12-15 NOTE — NUR
notified PHARMACY THAT PT'S SYNTHROID MED IS NOT ABLE TO BE LOCATED IN THIS ER.
THEY ARE TO SEND IT DOWN---CASSIDY SARMIENTO RN

## 2020-12-15 NOTE — NUR
DR FRENCH CALLED [facility]: BP 82/41. ORDERS REC'D TO INCREASE IV FLUIDS TO
125/HR. REMAINS % NON-REBREATER PO 93%. PT REMAINS AGIGATED AT TIMES.
EASILY AROUSED. ATTEMPTS TO PLACE 16 OR 14 FR VERONICA CATHETER UNSUCESSFUL.
UNABLE TO ADVANCE COMPLETELY DUE TO RESISTANCE.
UDAY MURPHYRN

## 2020-12-16 ENCOUNTER — HOSPITAL ENCOUNTER (INPATIENT)
Dept: HOSPITAL 83 - 4E | Age: 55
LOS: 4 days | DRG: 871 | End: 2020-12-20
Attending: STUDENT IN AN ORGANIZED HEALTH CARE EDUCATION/TRAINING PROGRAM | Admitting: STUDENT IN AN ORGANIZED HEALTH CARE EDUCATION/TRAINING PROGRAM
Payer: COMMERCIAL

## 2020-12-16 VITALS — DIASTOLIC BLOOD PRESSURE: 85 MMHG | SYSTOLIC BLOOD PRESSURE: 105 MMHG

## 2020-12-16 VITALS — SYSTOLIC BLOOD PRESSURE: 103 MMHG | DIASTOLIC BLOOD PRESSURE: 85 MMHG

## 2020-12-16 VITALS — DIASTOLIC BLOOD PRESSURE: 124 MMHG | SYSTOLIC BLOOD PRESSURE: 197 MMHG

## 2020-12-16 VITALS — SYSTOLIC BLOOD PRESSURE: 92 MMHG

## 2020-12-16 VITALS — DIASTOLIC BLOOD PRESSURE: 72 MMHG

## 2020-12-16 VITALS — DIASTOLIC BLOOD PRESSURE: 68 MMHG

## 2020-12-16 VITALS — SYSTOLIC BLOOD PRESSURE: 94 MMHG | DIASTOLIC BLOOD PRESSURE: 49 MMHG

## 2020-12-16 VITALS — SYSTOLIC BLOOD PRESSURE: 90 MMHG | DIASTOLIC BLOOD PRESSURE: 71 MMHG

## 2020-12-16 VITALS — HEIGHT: 68 IN | WEIGHT: 140 LBS | BODY MASS INDEX: 21.22 KG/M2

## 2020-12-16 VITALS — DIASTOLIC BLOOD PRESSURE: 71 MMHG | SYSTOLIC BLOOD PRESSURE: 93 MMHG

## 2020-12-16 DIAGNOSIS — M19.90: ICD-10-CM

## 2020-12-16 DIAGNOSIS — J44.9: ICD-10-CM

## 2020-12-16 DIAGNOSIS — F10.21: ICD-10-CM

## 2020-12-16 DIAGNOSIS — I10: ICD-10-CM

## 2020-12-16 DIAGNOSIS — R29.6: ICD-10-CM

## 2020-12-16 DIAGNOSIS — D53.9: ICD-10-CM

## 2020-12-16 DIAGNOSIS — A41.9: Primary | ICD-10-CM

## 2020-12-16 DIAGNOSIS — F10.231: ICD-10-CM

## 2020-12-16 DIAGNOSIS — K76.0: ICD-10-CM

## 2020-12-16 DIAGNOSIS — F41.9: ICD-10-CM

## 2020-12-16 DIAGNOSIS — J12.89: ICD-10-CM

## 2020-12-16 DIAGNOSIS — R65.20: ICD-10-CM

## 2020-12-16 DIAGNOSIS — E83.42: ICD-10-CM

## 2020-12-16 DIAGNOSIS — Z51.5: ICD-10-CM

## 2020-12-16 DIAGNOSIS — E03.9: ICD-10-CM

## 2020-12-16 DIAGNOSIS — I25.10: ICD-10-CM

## 2020-12-16 DIAGNOSIS — Z20.828: ICD-10-CM

## 2020-12-16 DIAGNOSIS — J94.9: ICD-10-CM

## 2020-12-16 DIAGNOSIS — E43: ICD-10-CM

## 2020-12-16 DIAGNOSIS — E83.52: ICD-10-CM

## 2020-12-16 DIAGNOSIS — U07.1: ICD-10-CM

## 2020-12-16 DIAGNOSIS — I48.91: ICD-10-CM

## 2020-12-16 DIAGNOSIS — E87.0: ICD-10-CM

## 2020-12-16 DIAGNOSIS — J96.01: ICD-10-CM

## 2020-12-16 DIAGNOSIS — E87.6: ICD-10-CM

## 2020-12-16 DIAGNOSIS — E87.2: ICD-10-CM

## 2020-12-16 LAB
ALBUMIN SERPL-MCNC: 1.6 GM/DL (ref 3.1–4.5)
ALP SERPL-CCNC: 77 U/L (ref 45–117)
ALT SERPL W P-5'-P-CCNC: 35 U/L (ref 12–78)
AST SERPL-CCNC: 52 IU/L (ref 3–35)
BASE EXCESS BLDA CALC-SCNC: -4 MMOL/L (ref -2–2)
BUN SERPL-MCNC: 14 MG/DL (ref 7–24)
BURR CELLS BLD QL SMEAR: (no result)
CHLORIDE SERPL-SCNC: 116 MMOL/L (ref 98–107)
CK SERPL-CCNC: 39 U/L (ref 39–308)
CREAT SERPL-MCNC: 0.38 MG/DL (ref 0.7–1.3)
ERYTHROCYTE [DISTWIDTH] IN BLOOD BY AUTOMATED COUNT: 19.3 % (ref 0–14.5)
HCT VFR BLD AUTO: 43.2 % (ref 42–52)
LDH SERPL-CCNC: 258 U/L (ref 87–241)
MACROCYTES BLD QL SMEAR: SLIGHT
MCH RBC QN AUTO: 34.3 PG (ref 27–31)
MCHC RBC AUTO-ENTMCNC: 31.5 G/DL (ref 33–37)
MCV RBC AUTO: 109.1 FL (ref 80–94)
NRBC BLD QL AUTO: 0.2 % (ref 0–0)
PCO2 BLDA: 25 MMHG (ref 35–45)
PH BLDA: 7.47 [PH] (ref 7.35–7.45)
PLATELET # BLD AUTO: 226 10*3/UL (ref 130–400)
PLATELET SUFFICIENCY: NORMAL
PMV BLD AUTO: 13.1 FL (ref 9.6–12.3)
PO2 BLDA: 62 MMHG (ref 80–90)
POLYCHROMASIA BLD QL SMEAR: SLIGHT
POTASSIUM SERPL-SCNC: 3.8 MMOL/L (ref 3.5–5.1)
PROT SERPL-MCNC: 6 GM/DL (ref 6.4–8.2)
RBC # BLD AUTO: 3.96 10*6/UL (ref 4.5–5.9)
SAO2 % BLDA: 91 % (ref 95–97)
SODIUM SERPL-SCNC: 146 MMOL/L (ref 136–145)
TARGETS BLD QL SMEAR: (no result)
TOTAL CELLS COUNTED: 100 #CELLS
VARIANT LYMPHS NFR BLD MANUAL: 1 % (ref 0–0)
WBC NRBC COR # BLD AUTO: 9 10*3/UL (ref 4.8–10.8)

## 2020-12-16 PROCEDURE — 5A09357 ASSISTANCE WITH RESPIRATORY VENTILATION, LESS THAN 24 CONSECUTIVE HOURS, CONTINUOUS POSITIVE AIRWAY PRESSURE: ICD-10-PCS | Performed by: INTERNAL MEDICINE

## 2020-12-16 NOTE — NUR
ASSUMED CARE OF PATIENT. PATIENT IS LETHARGIC WITH EASY AND REGULAR RESPERS.
FAMILY PRESENT AT BEDSIDE AND REFUSING VITALS AT THIS TIME. BED IS LOW,
LOCKED, ALARMED, AND CALL LIGHT IS WITHIN REACH. WILL CONTINUE TO MONITOR, SEE
INTERVENTIONS.

## 2020-12-16 NOTE — NUR
[facility] MARCELLA denton and ER staff evaluated and discussed patients
condition. They have found that this patient is not appropriate for GIP at
this time.

## 2020-12-16 NOTE — NUR
PATIENTS PO MEDS ARE DOCUMENTED AS NOT GIVEN AT THIS TIME DUE TO PATIENT NOT
BEING ABLE TO SWALLOW FROM PREVIOUS NURSES SHIFT REPORT.

## 2020-12-16 NOTE — NUR
Patient is now qualifying for in patient [facility] hospice services and will be
admitted under [facility].

## 2020-12-16 NOTE — NUR
PT HAS BEEN MOVED FROM ROOM 12 TO ROOM 7 AND MOVED TO A HOSPITAL BED. CALLED
DR. COOLEY WHO REPORTS THE PT'S HR NEEDS TO DECREASE IN ORDER FOR BLOOD
PRESSURE TO IMPROVE. PT IN AFIB, HR 140S. BP 90/71. PT APPEARS TO BE MOTTLING.

## 2020-12-16 NOTE — NUR
FAMILY HERE AT THIS TIME TO SEE PATIENT. JULIO MARSHALL STATES OK FOR PATIENTS
FAMILY TO COME BACK AND BE WITH THEM.

## 2020-12-16 NOTE — NUR
PATIENT FAMILY AND DR FRENCH IN SPEAKING TO [facility] AT THIS
TIME. FAMILY HAS DECIDED TO GO WITH HOSPICE FULLY.

## 2020-12-16 NOTE — NUR
CONTACTED DR SCHROEDER/HECTOR ABOUT PATIENT. NOTIFIED THEM THAT PATIENT IS RESTLESS,
BP IS 82/OO. PATIENT STILL CURRENTLY APPEARS TO BE IN A FIB WITH RVR WITH A
RATE OF ABOUT 140-150. PATIENT HAS BEEN GIVEN MULTIPLE MEDS INCLUDING
LOPRESSOR, DIGOXIN, AND CARDIZEM. CARDIZEM WAS DC'D DUE TO LOW BP ON A PRIOR
SHIFT. CONTACTED THESE DOCTORS IN REGARDS TO STARTING PATIENT ON A PRESSER OR
IV FLUIDS SO WE CAN RESTART THE CARDIZEM POSSIBLY AND THEY STATED THAT THEY ARE
WAITING FOR THE PATIENTS COVID SWAB RESULTS TO COME BACK.

## 2020-12-16 NOTE — NUR
Received order for hospice/[facility]. Contacted Drew and faxed order &
clinicals. Drew will be calling contact and get back to me with a time.

## 2020-12-16 NOTE — NUR
CALL PLACED TO [facility] IN REGARDS TO FLUIDS TO KVO. NURSE
RETURNED CALL AND SAID "DO WHATEVER YOU HAVE TO DO TO KEEP THE VEIN OPEN SO HE
CAN GET THE MEDICINE." SEE NEW ORDERS.

## 2020-12-16 NOTE — NUR
[facility] CALLED IN REGARDS TO BOTHER CONTACTING. PATIENT
ASSESSED. PCA PUMP INFUSING PER ORDER AND NORMAL SALINE INFUSING AT [facility] RATE.
WILL CONTINUE TO MONITOR.

## 2020-12-16 NOTE — NUR
Time: 1600
A 55  year old MALE  admitted to 4E
under services of CRYSTAL COLUNGA DO.
Pt. arrived via bed from
ED  Chief complaint: .ETOH WITH DELERIU,COVID NOW [facility] PT.
 
 
ABEL DOTSON

## 2020-12-16 NOTE — NUR
PATIENT LEVOPHED STARTED AT 12MCG/MIN. CURRENTLY WAITING FOR FAMILY TO ARRIVE
TO SEE PATIENT TO DECIDE ON TO INTUBATE OR IF THEY WOULD LIKE TO MAKE HOSPICE
PER FAMILY.

## 2020-12-16 NOTE — NUR
PATIENT RECOMMENDATIONS HAVE BEEN GIVEN TO THIS NURSE. COPIES MADE AND PLACED
WITH PATIENT CHART. ALSO COPY GIVEN TO DR YOUNG.

## 2020-12-16 NOTE — NUR
PRN ATIVAN GIVEN AT THIS TIME FOR INCREASED AGITATION. PATIENT CHECKED FOR
INCONTINENCE AND ATTEMPTED TO REPOSITION.  WILL CONTINUE TO MONITOR.

## 2020-12-17 VITALS — SYSTOLIC BLOOD PRESSURE: 76 MMHG | DIASTOLIC BLOOD PRESSURE: 54 MMHG

## 2020-12-17 VITALS — DIASTOLIC BLOOD PRESSURE: 54 MMHG | SYSTOLIC BLOOD PRESSURE: 89 MMHG

## 2020-12-17 VITALS — DIASTOLIC BLOOD PRESSURE: 55 MMHG

## 2020-12-17 NOTE — NUR
RESPIRATIONS DOWN TO 5/MIN. PT APPEARS COMFORTABLE. PT REMAINS ON
NONREBREATHER WITH IV MORPHINE INFUSING AT 5MG/HR. FAMILY AT BEDSIDE.

## 2020-12-17 NOTE — NUR
PT SEEN AND ASSESSED. FAMILY AT BEDSIDE. PT FAMILY EDUCATED ON VERBALIZING
NEEDS. PT FAMILY OFFERED REFRESHMENTS. PT FAMILY VERBALIZES NO OTHER NEEDS AT
THIS TIME.

## 2020-12-17 NOTE — NUR
UPON ASSESSMENT, PATIENT APPEARS TO BE COMFORTABLE AND RELAXED. UNRESPONSIVE
TO VERBAL STIMULI. % NONREBREATHER MASK, POX 98%. PT REPOSITIONED FOR
COMFORT AND FAMILY MEMBERS ENCOURAGED TO CALL FOR ASSIST IF HE APPEARS TO BE
IN ANY FORM OF DISTRESS. INSTRUCTED THEM ON SIGHS/SYMPTOMS TO LOOK FOR.

## 2020-12-18 VITALS — DIASTOLIC BLOOD PRESSURE: 51 MMHG | SYSTOLIC BLOOD PRESSURE: 81 MMHG

## 2020-12-18 VITALS — DIASTOLIC BLOOD PRESSURE: 64 MMHG | SYSTOLIC BLOOD PRESSURE: 86 MMHG

## 2020-12-18 VITALS — DIASTOLIC BLOOD PRESSURE: 48 MMHG | SYSTOLIC BLOOD PRESSURE: 84 MMHG

## 2020-12-18 VITALS — DIASTOLIC BLOOD PRESSURE: 64 MMHG | SYSTOLIC BLOOD PRESSURE: 85 MMHG

## 2020-12-18 NOTE — NUR
CHECKED IN ON PT, NO SXS OF DISTRESS. 5 BPM. MORPHINE DRIP SEEMS TO BE
EFFECTIVE. NO COMPLAINTS FROM FAMILY. CONTINUES TO DECLINE
REFRESHMENTS/SNACKS.

## 2020-12-18 NOTE — NUR
PT SEEN AND ASSESSED. PT RESTING QUIETLY, FAMILY AT BEDSIDE. NO CURRENT C/O AT
THIS TIME. MORPHINE DRIP EFFECTIVE FOR PAIN MANAGMENT.

## 2020-12-18 NOTE — NUR
FAMILY AT BEDSIDE. NO VOICED COMPLAINTS. PT COMFORTABLE UPON ASSESSMENT WITH
IV MORPHINE INFUSING AT 5MG/ML AS ORDERED. RESPERATIONS 3 BPM. OFFERED
REFRESHMENTS TRAY TO FAMILY, DECLINED.

## 2020-12-19 VITALS — SYSTOLIC BLOOD PRESSURE: 73 MMHG | DIASTOLIC BLOOD PRESSURE: 48 MMHG

## 2020-12-19 VITALS — DIASTOLIC BLOOD PRESSURE: 52 MMHG

## 2020-12-19 VITALS — DIASTOLIC BLOOD PRESSURE: 49 MMHG | SYSTOLIC BLOOD PRESSURE: 73 MMHG

## 2020-12-19 VITALS — DIASTOLIC BLOOD PRESSURE: 53 MMHG

## 2020-12-19 VITALS — DIASTOLIC BLOOD PRESSURE: 58 MMHG

## 2020-12-19 NOTE — NUR
RESTING CONFORTABLY, 5MG/HR MORPHINE INFUSING WITH EASE. FAMILY AT BEDSIDE. NO
COMPLAINTS VOICED. CALL LIGHT IN REACH. MORPHINE REMAINS EFFECTIVE.
RESPERATIONS 8 PER MIN.

## 2020-12-19 NOTE — NUR
RESTING COMFORTABLY. NO SXS OF DISTRESS NOTED. MORPHINE DRIP EFFECTIVE
AT 5MG/HR. CALL LIGHT IN REACH. FAMILY AT BEDSIDE. QUESTIONS ABOUT DEATH
PROCESS ANSWERED.

## 2020-12-20 VITALS — DIASTOLIC BLOOD PRESSURE: 60 MMHG

## 2020-12-20 NOTE — NUR
NUMBER FOUR MORPHINE BAG STARTED. PATIENT UNRESPONSE. NON REBREATHER 15L.
MOIST BREATH SOUNDS, RESPIRATIONS 24. FAMILY MEMBERS AT BEDSIDE, EDUCATION
ABOUT [facility].

## 2020-12-20 NOTE — NUR
PATIENT   VERIFIED BY 2 RNS. SHIFT DIRECTOR NOTIFIED AT 0655,
DR. HALL CONTACTED AT 0656, Xintu Shuju CALLED AT 0708, SPOKE WITH BONY BODY
RELEASED REFERENCE #[id number].

## 2020-12-20 NOTE — NUR
PATIENT UNRESPONSIVE. SHALLOW RESPIRATIONS, EIGHT PER MINUTES. RADIAL PULSE
STRONG AND SLOW. INCREASED MOLDING TO UPPER BILATERAL LEGS. INCREASED
GENERALIZED PALENESS.

## 2024-10-11 NOTE — NUR
PT REPOSITIONED TO LEFT SIDE. PT MOVED UP IN BED. PT CONDITION THE SAME. PT
ADMINISTERED DIGOXIN. Rash is resolving well but has residual itching   Was sent hydroxyzine by [facility] which helps   Keep skin moisturized   If persist he will need to see derm